# Patient Record
Sex: MALE | Race: WHITE | NOT HISPANIC OR LATINO | Employment: OTHER | URBAN - METROPOLITAN AREA
[De-identification: names, ages, dates, MRNs, and addresses within clinical notes are randomized per-mention and may not be internally consistent; named-entity substitution may affect disease eponyms.]

---

## 2017-01-14 ENCOUNTER — HOSPITAL ENCOUNTER (EMERGENCY)
Facility: HOSPITAL | Age: 62
Discharge: HOME | End: 2017-01-14
Attending: EMERGENCY MEDICINE | Admitting: EMERGENCY MEDICINE

## 2017-01-14 ENCOUNTER — APPOINTMENT (OUTPATIENT)
Dept: CT IMAGING | Facility: HOSPITAL | Age: 62
End: 2017-01-14

## 2017-01-14 ENCOUNTER — APPOINTMENT (OUTPATIENT)
Dept: GENERAL RADIOLOGY | Facility: HOSPITAL | Age: 62
End: 2017-01-14

## 2017-01-14 VITALS
SYSTOLIC BLOOD PRESSURE: 142 MMHG | WEIGHT: 240 LBS | HEIGHT: 69 IN | DIASTOLIC BLOOD PRESSURE: 90 MMHG | OXYGEN SATURATION: 92 % | HEART RATE: 89 BPM | TEMPERATURE: 98.3 F | BODY MASS INDEX: 35.55 KG/M2 | RESPIRATION RATE: 16 BRPM

## 2017-01-14 DIAGNOSIS — S39.011A ABDOMINAL WALL STRAIN, INITIAL ENCOUNTER: Primary | ICD-10-CM

## 2017-01-14 LAB
ALBUMIN SERPL-MCNC: 4.2 G/DL (ref 3.5–5.2)
ALBUMIN/GLOB SERPL: 1.3 G/DL
ALP SERPL-CCNC: 96 U/L (ref 39–117)
ALT SERPL W P-5'-P-CCNC: 10 U/L (ref 1–41)
ANION GAP SERPL CALCULATED.3IONS-SCNC: 15.2 MMOL/L
AST SERPL-CCNC: 14 U/L (ref 1–40)
BASOPHILS # BLD AUTO: 0.06 10*3/MM3 (ref 0–0.2)
BASOPHILS NFR BLD AUTO: 0.6 % (ref 0–1.5)
BILIRUB SERPL-MCNC: 0.3 MG/DL (ref 0.1–1.2)
BILIRUB UR QL STRIP: NEGATIVE
BUN BLD-MCNC: 23 MG/DL (ref 8–23)
BUN/CREAT SERPL: 16.3 (ref 7–25)
CALCIUM SPEC-SCNC: 9.3 MG/DL (ref 8.6–10.5)
CHLORIDE SERPL-SCNC: 101 MMOL/L (ref 98–107)
CLARITY UR: CLEAR
CO2 SERPL-SCNC: 22.8 MMOL/L (ref 22–29)
COLOR UR: YELLOW
CREAT BLD-MCNC: 1.41 MG/DL (ref 0.76–1.27)
DEPRECATED RDW RBC AUTO: 45.6 FL (ref 37–54)
EOSINOPHIL # BLD AUTO: 0.44 10*3/MM3 (ref 0–0.7)
EOSINOPHIL NFR BLD AUTO: 4.5 % (ref 0.3–6.2)
ERYTHROCYTE [DISTWIDTH] IN BLOOD BY AUTOMATED COUNT: 13.3 % (ref 11.5–14.5)
GFR SERPL CREATININE-BSD FRML MDRD: 51 ML/MIN/1.73
GLOBULIN UR ELPH-MCNC: 3.2 GM/DL
GLUCOSE BLD-MCNC: 204 MG/DL (ref 65–99)
GLUCOSE UR STRIP-MCNC: ABNORMAL MG/DL
HCT VFR BLD AUTO: 40.7 % (ref 40.4–52.2)
HGB BLD-MCNC: 13.4 G/DL (ref 13.7–17.6)
HGB UR QL STRIP.AUTO: NEGATIVE
IMM GRANULOCYTES # BLD: 0.03 10*3/MM3 (ref 0–0.03)
IMM GRANULOCYTES NFR BLD: 0.3 % (ref 0–0.5)
KETONES UR QL STRIP: NEGATIVE
LEUKOCYTE ESTERASE UR QL STRIP.AUTO: NEGATIVE
LIPASE SERPL-CCNC: 107 U/L (ref 13–60)
LYMPHOCYTES # BLD AUTO: 2.56 10*3/MM3 (ref 0.9–4.8)
LYMPHOCYTES NFR BLD AUTO: 26 % (ref 19.6–45.3)
MCH RBC QN AUTO: 31.2 PG (ref 27–32.7)
MCHC RBC AUTO-ENTMCNC: 32.9 G/DL (ref 32.6–36.4)
MCV RBC AUTO: 94.7 FL (ref 79.8–96.2)
MONOCYTES # BLD AUTO: 0.49 10*3/MM3 (ref 0.2–1.2)
MONOCYTES NFR BLD AUTO: 5 % (ref 5–12)
NEUTROPHILS # BLD AUTO: 6.25 10*3/MM3 (ref 1.9–8.1)
NEUTROPHILS NFR BLD AUTO: 63.6 % (ref 42.7–76)
NITRITE UR QL STRIP: NEGATIVE
PH UR STRIP.AUTO: <=5 [PH] (ref 5–8)
PLATELET # BLD AUTO: 207 10*3/MM3 (ref 140–500)
PMV BLD AUTO: 9.9 FL (ref 6–12)
POTASSIUM BLD-SCNC: 4.6 MMOL/L (ref 3.5–5.2)
PROT SERPL-MCNC: 7.4 G/DL (ref 6–8.5)
PROT UR QL STRIP: NEGATIVE
RBC # BLD AUTO: 4.3 10*6/MM3 (ref 4.6–6)
SODIUM BLD-SCNC: 139 MMOL/L (ref 136–145)
SP GR UR STRIP: 1.03 (ref 1–1.03)
TROPONIN T SERPL-MCNC: <0.01 NG/ML (ref 0–0.03)
UROBILINOGEN UR QL STRIP: ABNORMAL
WBC NRBC COR # BLD: 9.83 10*3/MM3 (ref 4.5–10.7)

## 2017-01-14 PROCEDURE — 74176 CT ABD & PELVIS W/O CONTRAST: CPT

## 2017-01-14 PROCEDURE — 83690 ASSAY OF LIPASE: CPT | Performed by: EMERGENCY MEDICINE

## 2017-01-14 PROCEDURE — 80053 COMPREHEN METABOLIC PANEL: CPT | Performed by: EMERGENCY MEDICINE

## 2017-01-14 PROCEDURE — 99284 EMERGENCY DEPT VISIT MOD MDM: CPT

## 2017-01-14 PROCEDURE — 81003 URINALYSIS AUTO W/O SCOPE: CPT | Performed by: EMERGENCY MEDICINE

## 2017-01-14 PROCEDURE — 71101 X-RAY EXAM UNILAT RIBS/CHEST: CPT

## 2017-01-14 PROCEDURE — 84484 ASSAY OF TROPONIN QUANT: CPT | Performed by: EMERGENCY MEDICINE

## 2017-01-14 PROCEDURE — 85025 COMPLETE CBC W/AUTO DIFF WBC: CPT | Performed by: EMERGENCY MEDICINE

## 2017-01-14 PROCEDURE — 93010 ELECTROCARDIOGRAM REPORT: CPT | Performed by: INTERNAL MEDICINE

## 2017-01-14 PROCEDURE — 93005 ELECTROCARDIOGRAM TRACING: CPT | Performed by: EMERGENCY MEDICINE

## 2017-01-14 RX ORDER — SODIUM CHLORIDE 0.9 % (FLUSH) 0.9 %
10 SYRINGE (ML) INJECTION AS NEEDED
Status: DISCONTINUED | OUTPATIENT
Start: 2017-01-14 | End: 2017-01-14 | Stop reason: HOSPADM

## 2017-01-14 RX ORDER — ONDANSETRON 2 MG/ML
4 INJECTION INTRAMUSCULAR; INTRAVENOUS ONCE
Status: DISCONTINUED | OUTPATIENT
Start: 2017-01-14 | End: 2017-01-14 | Stop reason: HOSPADM

## 2017-01-14 NOTE — DISCHARGE INSTRUCTIONS
You can use your home hydrocodone to help with your pain. Use over the counter Miralax powder once a day while using the hydrocodone.  Please return to the ED if symptoms worsen and follow up with your family doctor.

## 2017-01-14 NOTE — ED NOTES
Pt. C/o Abd pain/ doesn't c/o of coughing, but has coughed while here.      Portillo Nicole RN  01/14/17 0967

## 2017-01-14 NOTE — ED PROVIDER NOTES
" EMERGENCY DEPARTMENT ENCOUNTER    CHIEF COMPLAINT  Chief Complaint: Right-sided abd pain  History given by: Patient  History limited by: Nothing  Room Number: 30/30  PMD: Kevin Pozo MD      HPI:  Pt is a 61 y.o. male who presents complaining of intermittent RUQ abd pain onset 0700 yesterday morning with symptoms worsening at 1700 yesterday. The pt states the pain is not constant, but is only present when he moves. The pt denies radiation. The pt states his episodes last about 15 seconds. The pt states the pain is worse with movement. The pt states that fatty food does not affect the pain. The pt denies lifting any heavy objects. Pt denies nausea, vomiting, hematuria, fever, and rash. Pt reports productive cough with yellow sputum. The pt reports hx of carotid surgery. The pt reports hx of kidney stones.    Duration: Since 0700 yesterday morning with symptoms worsening at 1700 yesterday  Onset: Gradual  Timing: Intermittent  Location: RUQ  Radiation: None  Quality: \"Pain\"  Intensity/Severity: Moderate  Progression: Unchanged  Associated Symptoms: Productive cough with yellow sputum  Aggravating Factors: Movement  Alleviating Factors: Nothing  Previous Episodes: None  Treatment before arrival: Nothing    PAST MEDICAL HISTORY  Active Ambulatory Problems     Diagnosis Date Noted   • Asymptomatic stenosis of right carotid artery without infarction 11/16/2016   • Chronic renal insufficiency, stage II (mild) 11/16/2016   • Type 2 diabetes mellitus with diabetic neuropathic arthropathy 11/16/2016   • Hypertension associated with chronic kidney disease due to type 2 diabetes mellitus 11/16/2016   • Hyperlipemia 11/16/2016   • Atherosclerosis with claudication of extremity 11/16/2016   • Coronary artery disease involving native coronary artery without angina pectoris 11/16/2016     Resolved Ambulatory Problems     Diagnosis Date Noted   • No Resolved Ambulatory Problems     Past Medical History   Diagnosis Date   • Carotid " artery stenosis    • Diabetes mellitus    • Hyperlipidemia    • Hypertension    • Peripheral vascular disease    • Psoriasis        PAST SURGICAL HISTORY  Past Surgical History   Procedure Laterality Date   • Knee surgery Left    • Finger surgery Left    • Pr thromboendartectmy neck,neck incis Right 11/16/2016     Procedure: RIGHT CAROTID ENDARTERECTOMY;  Surgeon: Roger Michael MD;  Location: [facility] OR;  Service: Vascular   • Knee arthroscopy         FAMILY HISTORY  Family History   Problem Relation Age of Onset   • Valvular heart disease Mother    • Heart disease Father    • Cancer Paternal Grandfather        SOCIAL HISTORY  Social History     Social History   • Marital status: Single     Spouse name: N/A   • Number of children: N/A   • Years of education: N/A     Occupational History   • Not on file.     Social History Main Topics   • Smoking status: Current Every Day Smoker     Packs/day: 1.00     Years: 30.00     Types: Cigarettes   • Smokeless tobacco: Never Used   • Alcohol use No      Comment: OCC   • Drug use: No   • Sexual activity: Defer     Other Topics Concern   • Not on file     Social History Narrative   • No narrative on file       ALLERGIES  Erythromycin    REVIEW OF SYSTEMS  Review of Systems   Constitutional: Negative for chills and fever.   HENT: Negative for sore throat and trouble swallowing.    Eyes: Negative for visual disturbance.   Respiratory: Positive for cough (Productive with yellow sputum). Negative for shortness of breath.    Cardiovascular: Negative for chest pain and leg swelling.   Gastrointestinal: Positive for abdominal pain (RUQ). Negative for diarrhea and vomiting.   Endocrine: Negative.    Genitourinary: Negative for decreased urine volume and frequency.   Musculoskeletal: Negative for neck pain.   Skin: Negative for rash.   Allergic/Immunologic: Negative.    Neurological: Negative for weakness and numbness.   Hematological: Negative.    Psychiatric/Behavioral:  Negative.    All other systems reviewed and are negative.      PHYSICAL EXAM  ED Triage Vitals   Temp Heart Rate Resp BP SpO2   01/14/17 0922 01/14/17 0922 01/14/17 0922 01/14/17 0940 01/14/17 0922   98.3 °F (36.8 °C) 123 16 125/95 95 %      Temp src Heart Rate Source Patient Position BP Location FiO2 (%)   01/14/17 0922 01/14/17 1036 01/14/17 0940 01/14/17 0940 --   Tympanic Monitor Lying Right arm          Physical Exam   Constitutional: He is oriented to person, place, and time. He appears distressed (Mild).   The pt is well developed.   HENT:   Head: Normocephalic and atraumatic.   Eyes: EOM are normal. Pupils are equal, round, and reactive to light.   Neck: Normal range of motion. Neck supple.   Cardiovascular: Normal rate, regular rhythm and normal heart sounds.  Exam reveals no gallop.    No murmur heard.  Pulmonary/Chest: Effort normal. No respiratory distress. He has rhonchi (Right posterior lower).   Abdominal: Soft. Bowel sounds are normal. There is tenderness (Mild) in the right upper quadrant. There is no rebound, no guarding and negative Mcdaniels's sign.       Musculoskeletal: Normal range of motion. He exhibits no edema.   Neurological: He is alert and oriented to person, place, and time. He has normal sensation and normal strength.   Skin: Skin is warm and dry.   Psychiatric: Mood and affect normal.   Nursing note and vitals reviewed.      LAB RESULTS  Lab Results (last 24 hours)     Procedure Component Value Units Date/Time    CBC & Differential [[id number]] Collected:  01/14/17 0956    Specimen:  Blood Updated:  01/14/17 1010    Narrative:       The following orders were created for panel order CBC & Differential.  Procedure                               Abnormality         Status                     ---------                               -----------         ------                     CBC Auto Differential[[id number]]         Abnormal            Final result                 Please view results for these  tests on the individual orders.    Comprehensive Metabolic Panel [[id number]]  (Abnormal) Collected:  01/14/17 0956    Specimen:  Blood Updated:  01/14/17 1033     Glucose 204 (H) mg/dL      BUN 23 mg/dL      Creatinine 1.41 (H) mg/dL      Sodium 139 mmol/L      Potassium 4.6 mmol/L      Chloride 101 mmol/L      CO2 22.8 mmol/L      Calcium 9.3 mg/dL      Total Protein 7.4 g/dL      Albumin 4.20 g/dL      ALT (SGPT) 10 U/L      AST (SGOT) 14 U/L      Alkaline Phosphatase 96 U/L      Total Bilirubin 0.3 mg/dL      eGFR Non African Amer 51 (L) mL/min/1.73      Globulin 3.2 gm/dL      A/G Ratio 1.3 g/dL      BUN/Creatinine Ratio 16.3      Anion Gap 15.2 mmol/L     Lipase [[id number]]  (Abnormal) Collected:  01/14/17 0956    Specimen:  Blood Updated:  01/14/17 1033     Lipase 107 (H) U/L     Troponin [[id number]]  (Normal) Collected:  01/14/17 0956    Specimen:  Blood Updated:  01/14/17 1033     Troponin T <0.010 ng/mL     Narrative:       Troponin T Reference Ranges:  Less than 0.03 ng/mL:    Negative for AMI  0.03 to 0.09 ng/mL:      Indeterminant for AMI  Greater than 0.09 ng/mL: Positive for AMI    CBC Auto Differential [[id number]]  (Abnormal) Collected:  01/14/17 0956    Specimen:  Blood Updated:  01/14/17 1010     WBC 9.83 10*3/mm3      RBC 4.30 (L) 10*6/mm3      Hemoglobin 13.4 (L) g/dL      Hematocrit 40.7 %      MCV 94.7 fL      MCH 31.2 pg      MCHC 32.9 g/dL      RDW 13.3 %      RDW-SD 45.6 fl      MPV 9.9 fL      Platelets 207 10*3/mm3      Neutrophil % 63.6 %      Lymphocyte % 26.0 %      Monocyte % 5.0 %      Eosinophil % 4.5 %      Basophil % 0.6 %      Immature Grans % 0.3 %      Neutrophils, Absolute 6.25 10*3/mm3      Lymphocytes, Absolute 2.56 10*3/mm3      Monocytes, Absolute 0.49 10*3/mm3      Eosinophils, Absolute 0.44 10*3/mm3      Basophils, Absolute 0.06 10*3/mm3      Immature Grans, Absolute 0.03 10*3/mm3     Urinalysis With / Culture If Indicated [[id number]]  (Abnormal) Collected:  01/14/17 1039     Specimen:  Urine from Urine, Clean Catch Updated:  01/14/17 1040     Color, UA Yellow      Appearance, UA Clear      pH, UA <=5.0      Specific Gravity, UA 1.026      Glucose,  mg/dL (Trace) (A)      Ketones, UA Negative      Bilirubin, UA Negative      Blood, UA Negative      Protein, UA Negative      Leuk Esterase, UA Negative      Nitrite, UA Negative      Urobilinogen, UA 1.0 E.U./dL     Narrative:       Urine microscopic not indicated.          I ordered the above labs and reviewed the results    RADIOLOGY  XR Ribs Right With PA Chest   The heart is within normal limits in size. There is no evidence  of focal infiltrate, effusion or of congestive failure. There is mild  interstitial prominence involving the lung bases bilaterally. AP and  oblique views of the right hemithorax showed no evidence of a displaced  rib fracture. There is no evidence of pneumothorax.      CT Abdomen Pelvis Stone Protocol - Thickening of the distal esophagus, right renal stones that are unchanged from previous, and bilateral inguinal hernias that are unchanged from previous        I ordered the above noted radiological studies. Interpreted by radiologist. Discussed with radiologist (Dr. Ceja). Reviewed by me in PACS.       PROCEDURES  Procedures    EKG           EKG time: 0956  Rhythm/Rate: 92 Normal Sinus and occasional PAC  P waves and ND: Normal  QRS, axis: Normal   ST and T waves: Normal     Interpreted Contemporaneously by me, independently viewed  No prior for comparison      PROGRESS AND CONSULTS  ED Course     0948  Labs, EKG, and XR Right Ribs with Chest ordered for further evaluation. Morphine and Zofran ordered.    1100  CT Abd ordered for further evaluation.    1111  Rechecked pt, he is resting comfortably after receiving the Morphine. Discussed the XR and lab results with the pt. Discussed the plan to order a CT for further evaluation into the pt's elevated Lipase. The pt understands and agrees with the  plan.    1125  The pt refused taking contrast because he is a diabetic. I will order a CT Abd without contrast.    1206  Rechecked pt, he is resting comfortably. Discussed the CT results with the pt. Discussed the possible causes of the pt's pain. Discussed the plan to discharge the pt due to his negative work up. The pt understands and agrees with the plan.    I do not believe this is a PE because the pt's pain is so easily reproducible to palpation.    MEDICAL DECISION MAKING  Results were reviewed/discussed with the patient and they were also made aware of online access. Pt also made aware that some labs, such as cultures, will not be resulted during ER visit and follow up with PMD is necessary.     MDM  Number of Diagnoses or Management Options     Amount and/or Complexity of Data Reviewed  Clinical lab tests: ordered and reviewed (Lipase - 107)  Tests in the radiology section of CPT®: ordered and reviewed (XR Right Ribs with Chest - The heart is within normal limits in size. There is no evidence of focal infiltrate, effusion or of congestive failure. There is mild interstitial prominence involving the lung bases bilaterally. AP and  oblique views of the right hemithorax showed no evidence of a displaced rib fracture. There is no evidence of pneumothorax.  CT Abdomen Pelvis Stone Protocol - Thickening of the distal esophagus, right renal stones that are unchanged from previous, and bilateral inguinal hernias that are unchanged from previous)  Tests in the medicine section of CPT®: ordered and reviewed  Discussion of test results with the performing providers: yes (Dr. Ceja)  Discuss the patient with other providers: yes  Independent visualization of images, tracings, or specimens: yes    Patient Progress  Patient progress: stable         DIAGNOSIS  Final diagnoses:   Abdominal wall strain, initial encounter       DISPOSITION  DISCHARGE    Patient discharged in stable condition.    Reviewed implications of  results, diagnosis, meds, responsibility to follow up, warning signs and symptoms of possible worsening, potential complications and reasons to return to ER, including increased abd pain.    Patient/Family voiced understanding of above instructions.    Discussed plan for discharge, as there is no emergent indication for admission.  Pt/family is agreeable and understands need for follow up and repeat testing.  Pt is aware that discharge does not mean that nothing is wrong but it indicates no emergency is present that requires admission and they must continue care with follow-up as given below or physician of their choice.     FOLLOW-UP  Kevin Pozo MD  [id number] [address]  [phone]    Schedule an appointment as soon as possible for a visit           Medication List      Notice     No changes were made to your prescriptions during this visit.            Latest Documented Vital Signs:  As of 12:12 PM  BP- 126/82 HR- 84 Temp- 98.3 °F (36.8 °C) (Tympanic) O2 sat- 94%    --  Documentation assistance provided by traci Carranza for Dr. Francois.  Information recorded by the scribe was done at my direction and has been verified and validated by me.         David Carranza  01/14/17 1212       Gaurang Francois MD  01/14/17 0406

## 2017-09-18 RX ORDER — CARVEDILOL 3.12 MG/1
TABLET ORAL
Qty: 60 TABLET | Refills: 2 | Status: SHIPPED | OUTPATIENT
Start: 2017-09-18 | End: 2017-12-14 | Stop reason: SDUPTHER

## 2017-12-14 RX ORDER — CARVEDILOL 3.12 MG/1
TABLET ORAL
Qty: 60 TABLET | Refills: 2 | Status: SHIPPED | OUTPATIENT
Start: 2017-12-14 | End: 2018-05-11 | Stop reason: SDUPTHER

## 2018-05-25 RX ORDER — CARVEDILOL 3.12 MG/1
3.12 TABLET ORAL 2 TIMES DAILY
Qty: 60 TABLET | Refills: 3 | Status: SHIPPED | OUTPATIENT
Start: 2018-05-25 | End: 2018-11-30 | Stop reason: SDUPTHER

## 2018-05-25 RX ORDER — CARVEDILOL 3.12 MG/1
TABLET ORAL
Qty: 60 TABLET | Refills: 3 | Status: SHIPPED | OUTPATIENT
Start: 2018-05-25 | End: 2018-05-25 | Stop reason: SDUPTHER

## 2018-05-25 NOTE — TELEPHONE ENCOUNTER
Former [facility] pt, [facility], but nt[facility] on a Sat. Scheduled 8/4. Brenna will mail appt card w/date/time & address. Pt understands verbally.    Refilled x3-amk

## 2018-11-30 RX ORDER — CARVEDILOL 3.12 MG/1
TABLET ORAL
Qty: 60 TABLET | Refills: 0 | Status: SHIPPED | OUTPATIENT
Start: 2018-11-30 | End: 2019-01-06 | Stop reason: SDUPTHER

## 2019-01-08 RX ORDER — CARVEDILOL 3.12 MG/1
TABLET ORAL
Qty: 60 TABLET | Refills: 3 | Status: SHIPPED | OUTPATIENT
Start: 2019-01-08 | End: 2019-07-19 | Stop reason: SDUPTHER

## 2019-07-19 RX ORDER — CARVEDILOL 3.12 MG/1
TABLET ORAL
Qty: 60 TABLET | Refills: 3 | Status: SHIPPED | OUTPATIENT
Start: 2019-07-19 | End: 2020-02-10

## 2020-02-10 RX ORDER — CARVEDILOL 3.12 MG/1
TABLET ORAL
Qty: 60 TABLET | Refills: 3 | Status: SHIPPED | OUTPATIENT
Start: 2020-02-10 | End: 2020-08-10

## 2020-08-10 RX ORDER — CARVEDILOL 3.12 MG/1
TABLET ORAL
Qty: 60 TABLET | Refills: 0 | Status: SHIPPED | OUTPATIENT
Start: 2020-08-10

## 2020-09-10 RX ORDER — CARVEDILOL 3.12 MG/1
TABLET ORAL
Qty: 60 TABLET | Refills: 0 | OUTPATIENT
Start: 2020-09-10

## 2022-02-15 ENCOUNTER — APPOINTMENT (OUTPATIENT)
Dept: GENERAL RADIOLOGY | Facility: HOSPITAL | Age: 67
End: 2022-02-15

## 2022-02-15 ENCOUNTER — ANESTHESIA (OUTPATIENT)
Dept: PERIOP | Facility: HOSPITAL | Age: 67
End: 2022-02-15

## 2022-02-15 ENCOUNTER — APPOINTMENT (OUTPATIENT)
Dept: ULTRASOUND IMAGING | Facility: HOSPITAL | Age: 67
End: 2022-02-15

## 2022-02-15 ENCOUNTER — HOSPITAL ENCOUNTER (OUTPATIENT)
Facility: HOSPITAL | Age: 67
Discharge: HOME | End: 2022-02-16
Attending: EMERGENCY MEDICINE | Admitting: INTERNAL MEDICINE

## 2022-02-15 ENCOUNTER — ANESTHESIA EVENT (OUTPATIENT)
Dept: PERIOP | Facility: HOSPITAL | Age: 67
End: 2022-02-15

## 2022-02-15 ENCOUNTER — APPOINTMENT (OUTPATIENT)
Dept: CT IMAGING | Facility: HOSPITAL | Age: 67
End: 2022-02-15

## 2022-02-15 DIAGNOSIS — N18.9 CHRONIC KIDNEY DISEASE, UNSPECIFIED CKD STAGE: ICD-10-CM

## 2022-02-15 DIAGNOSIS — D72.829 LEUKOCYTOSIS, UNSPECIFIED TYPE: ICD-10-CM

## 2022-02-15 DIAGNOSIS — N20.1 LEFT URETERAL CALCULUS: Primary | ICD-10-CM

## 2022-02-15 DIAGNOSIS — R73.9 HYPERGLYCEMIA: ICD-10-CM

## 2022-02-15 PROBLEM — I73.9 PERIPHERAL VASCULAR DISEASE (HCC): Status: ACTIVE | Noted: 2022-02-15

## 2022-02-15 PROBLEM — N45.1 RIGHT EPIDIDYMITIS: Status: ACTIVE | Noted: 2022-02-15

## 2022-02-15 PROBLEM — E11.9 TYPE 2 DIABETES MELLITUS (HCC): Status: ACTIVE | Noted: 2022-02-15

## 2022-02-15 PROBLEM — I10 ESSENTIAL HYPERTENSION: Status: ACTIVE | Noted: 2022-02-15

## 2022-02-15 PROBLEM — K21.00 GERD WITH ESOPHAGITIS: Status: ACTIVE | Noted: 2022-02-15

## 2022-02-15 PROBLEM — N13.30 HYDROURETERONEPHROSIS: Status: ACTIVE | Noted: 2022-02-15

## 2022-02-15 PROBLEM — N18.30 CHRONIC KIDNEY FAILURE, STAGE 3 (MODERATE): Status: ACTIVE | Noted: 2022-02-15

## 2022-02-15 LAB
ALBUMIN SERPL-MCNC: 4.1 G/DL (ref 3.5–5.2)
ALBUMIN/GLOB SERPL: 1.2 G/DL
ALP SERPL-CCNC: 132 U/L (ref 39–117)
ALT SERPL W P-5'-P-CCNC: 26 U/L (ref 1–41)
ANION GAP SERPL CALCULATED.3IONS-SCNC: 13 MMOL/L (ref 5–15)
AST SERPL-CCNC: 25 U/L (ref 1–40)
BACTERIA UR QL AUTO: ABNORMAL /HPF
BASOPHILS # BLD AUTO: 0.06 10*3/MM3 (ref 0–0.2)
BASOPHILS NFR BLD AUTO: 0.4 % (ref 0–1.5)
BILIRUB SERPL-MCNC: 0.5 MG/DL (ref 0–1.2)
BILIRUB UR QL STRIP: NEGATIVE
BUN SERPL-MCNC: 20 MG/DL (ref 8–23)
BUN/CREAT SERPL: 14.4 (ref 7–25)
CALCIUM SPEC-SCNC: 9.6 MG/DL (ref 8.6–10.5)
CHLORIDE SERPL-SCNC: 99 MMOL/L (ref 98–107)
CLARITY UR: CLEAR
CO2 SERPL-SCNC: 21 MMOL/L (ref 22–29)
COLOR UR: YELLOW
CREAT SERPL-MCNC: 1.39 MG/DL (ref 0.76–1.27)
DEPRECATED RDW RBC AUTO: 40.3 FL (ref 37–54)
EOSINOPHIL # BLD AUTO: 0.39 10*3/MM3 (ref 0–0.4)
EOSINOPHIL NFR BLD AUTO: 2.3 % (ref 0.3–6.2)
ERYTHROCYTE [DISTWIDTH] IN BLOOD BY AUTOMATED COUNT: 12.5 % (ref 12.3–15.4)
GFR SERPL CREATININE-BSD FRML MDRD: 51 ML/MIN/1.73
GLOBULIN UR ELPH-MCNC: 3.4 GM/DL
GLUCOSE BLDC GLUCOMTR-MCNC: 184 MG/DL (ref 70–130)
GLUCOSE BLDC GLUCOMTR-MCNC: 189 MG/DL (ref 70–130)
GLUCOSE BLDC GLUCOMTR-MCNC: 197 MG/DL (ref 70–130)
GLUCOSE SERPL-MCNC: 268 MG/DL (ref 65–99)
GLUCOSE UR STRIP-MCNC: ABNORMAL MG/DL
HCT VFR BLD AUTO: 49 % (ref 37.5–51)
HGB BLD-MCNC: 17.3 G/DL (ref 13–17.7)
HGB UR QL STRIP.AUTO: ABNORMAL
HOLD SPECIMEN: NORMAL
HYALINE CASTS UR QL AUTO: ABNORMAL /LPF
IMM GRANULOCYTES # BLD AUTO: 0.07 10*3/MM3 (ref 0–0.05)
IMM GRANULOCYTES NFR BLD AUTO: 0.4 % (ref 0–0.5)
KETONES UR QL STRIP: ABNORMAL
LEUKOCYTE ESTERASE UR QL STRIP.AUTO: NEGATIVE
LIPASE SERPL-CCNC: 33 U/L (ref 13–60)
LYMPHOCYTES # BLD AUTO: 2.12 10*3/MM3 (ref 0.7–3.1)
LYMPHOCYTES NFR BLD AUTO: 12.6 % (ref 19.6–45.3)
MCH RBC QN AUTO: 32 PG (ref 26.6–33)
MCHC RBC AUTO-ENTMCNC: 35.3 G/DL (ref 31.5–35.7)
MCV RBC AUTO: 90.6 FL (ref 79–97)
MONOCYTES # BLD AUTO: 1.69 10*3/MM3 (ref 0.1–0.9)
MONOCYTES NFR BLD AUTO: 10.1 % (ref 5–12)
NEUTROPHILS NFR BLD AUTO: 12.45 10*3/MM3 (ref 1.7–7)
NEUTROPHILS NFR BLD AUTO: 74.2 % (ref 42.7–76)
NITRITE UR QL STRIP: NEGATIVE
NRBC BLD AUTO-RTO: 0 /100 WBC (ref 0–0.2)
PH UR STRIP.AUTO: <=5 [PH] (ref 5–8)
PLATELET # BLD AUTO: 191 10*3/MM3 (ref 140–450)
PMV BLD AUTO: 9.7 FL (ref 6–12)
POTASSIUM SERPL-SCNC: 4.2 MMOL/L (ref 3.5–5.2)
PROT SERPL-MCNC: 7.5 G/DL (ref 6–8.5)
PROT UR QL STRIP: ABNORMAL
RBC # BLD AUTO: 5.41 10*6/MM3 (ref 4.14–5.8)
RBC # UR STRIP: ABNORMAL /HPF
REF LAB TEST METHOD: ABNORMAL
SARS-COV-2 RNA PNL SPEC NAA+PROBE: NOT DETECTED
SODIUM SERPL-SCNC: 133 MMOL/L (ref 136–145)
SP GR UR STRIP: >=1.03 (ref 1–1.03)
SQUAMOUS #/AREA URNS HPF: ABNORMAL /HPF
UROBILINOGEN UR QL STRIP: ABNORMAL
WBC # UR STRIP: ABNORMAL /HPF
WBC NRBC COR # BLD: 16.78 10*3/MM3 (ref 3.4–10.8)
WHOLE BLOOD HOLD SPECIMEN: NORMAL

## 2022-02-15 PROCEDURE — G0378 HOSPITAL OBSERVATION PER HR: HCPCS

## 2022-02-15 PROCEDURE — C2617 STENT, NON-COR, TEM W/O DEL: HCPCS | Performed by: UROLOGY

## 2022-02-15 PROCEDURE — 63710000001 CILOSTAZOL 100 MG TABLET: Performed by: INTERNAL MEDICINE

## 2022-02-15 PROCEDURE — A9270 NON-COVERED ITEM OR SERVICE: HCPCS | Performed by: INTERNAL MEDICINE

## 2022-02-15 PROCEDURE — 80053 COMPREHEN METABOLIC PANEL: CPT | Performed by: EMERGENCY MEDICINE

## 2022-02-15 PROCEDURE — 0 CEFAZOLIN IN DEXTROSE 2-4 GM/100ML-% SOLUTION: Performed by: UROLOGY

## 2022-02-15 PROCEDURE — 25010000002 KETOROLAC TROMETHAMINE PER 15 MG: Performed by: EMERGENCY MEDICINE

## 2022-02-15 PROCEDURE — 83690 ASSAY OF LIPASE: CPT | Performed by: EMERGENCY MEDICINE

## 2022-02-15 PROCEDURE — 87635 SARS-COV-2 COVID-19 AMP PRB: CPT | Performed by: EMERGENCY MEDICINE

## 2022-02-15 PROCEDURE — 25010000002 MORPHINE PER 10 MG: Performed by: EMERGENCY MEDICINE

## 2022-02-15 PROCEDURE — 25010000002 ONDANSETRON PER 1 MG: Performed by: EMERGENCY MEDICINE

## 2022-02-15 PROCEDURE — 96375 TX/PRO/DX INJ NEW DRUG ADDON: CPT

## 2022-02-15 PROCEDURE — C1769 GUIDE WIRE: HCPCS | Performed by: UROLOGY

## 2022-02-15 PROCEDURE — 96361 HYDRATE IV INFUSION ADD-ON: CPT

## 2022-02-15 PROCEDURE — 74420 UROGRAPHY RTRGR +-KUB: CPT

## 2022-02-15 PROCEDURE — 36415 COLL VENOUS BLD VENIPUNCTURE: CPT

## 2022-02-15 PROCEDURE — 25010000002 PROPOFOL 10 MG/ML EMULSION: Performed by: NURSE ANESTHETIST, CERTIFIED REGISTERED

## 2022-02-15 PROCEDURE — 63710000001 CARVEDILOL 3.125 MG TABLET: Performed by: INTERNAL MEDICINE

## 2022-02-15 PROCEDURE — C9803 HOPD COVID-19 SPEC COLLECT: HCPCS

## 2022-02-15 PROCEDURE — 99284 EMERGENCY DEPT VISIT MOD MDM: CPT

## 2022-02-15 PROCEDURE — 63710000001 SENNOSIDES-DOCUSATE 8.6-50 MG TABLET: Performed by: INTERNAL MEDICINE

## 2022-02-15 PROCEDURE — 93976 VASCULAR STUDY: CPT

## 2022-02-15 PROCEDURE — 0 CEFAZOLIN IN DEXTROSE 2-4 GM/100ML-% SOLUTION: Performed by: NURSE ANESTHETIST, CERTIFIED REGISTERED

## 2022-02-15 PROCEDURE — 25010000002 ONDANSETRON PER 1 MG: Performed by: NURSE ANESTHETIST, CERTIFIED REGISTERED

## 2022-02-15 PROCEDURE — 25010000002 CEFTRIAXONE PER 250 MG: Performed by: INTERNAL MEDICINE

## 2022-02-15 PROCEDURE — 76870 US EXAM SCROTUM: CPT

## 2022-02-15 PROCEDURE — 85025 COMPLETE CBC W/AUTO DIFF WBC: CPT | Performed by: EMERGENCY MEDICINE

## 2022-02-15 PROCEDURE — 74176 CT ABD & PELVIS W/O CONTRAST: CPT

## 2022-02-15 PROCEDURE — 25010000002 FENTANYL CITRATE (PF) 50 MCG/ML SOLUTION: Performed by: NURSE ANESTHETIST, CERTIFIED REGISTERED

## 2022-02-15 PROCEDURE — 96374 THER/PROPH/DIAG INJ IV PUSH: CPT

## 2022-02-15 PROCEDURE — 81001 URINALYSIS AUTO W/SCOPE: CPT | Performed by: EMERGENCY MEDICINE

## 2022-02-15 PROCEDURE — 25010000002 DEXAMETHASONE PER 1 MG: Performed by: NURSE ANESTHETIST, CERTIFIED REGISTERED

## 2022-02-15 PROCEDURE — 82962 GLUCOSE BLOOD TEST: CPT

## 2022-02-15 DEVICE — URETERAL STENT
Type: IMPLANTABLE DEVICE | Site: URETER | Status: FUNCTIONAL
Brand: CONTOUR™

## 2022-02-15 RX ORDER — PROMETHAZINE HYDROCHLORIDE 25 MG/1
25 TABLET ORAL ONCE AS NEEDED
Status: DISCONTINUED | OUTPATIENT
Start: 2022-02-15 | End: 2022-02-15 | Stop reason: HOSPADM

## 2022-02-15 RX ORDER — PRAVASTATIN SODIUM 40 MG
40 TABLET ORAL DAILY
Status: DISCONTINUED | OUTPATIENT
Start: 2022-02-16 | End: 2022-02-16 | Stop reason: HOSPADM

## 2022-02-15 RX ORDER — LORAZEPAM 0.5 MG/1
0.5 TABLET ORAL EVERY 8 HOURS PRN
Status: DISCONTINUED | OUTPATIENT
Start: 2022-02-15 | End: 2022-02-16 | Stop reason: HOSPADM

## 2022-02-15 RX ORDER — SODIUM CHLORIDE 9 MG/ML
125 INJECTION, SOLUTION INTRAVENOUS CONTINUOUS
Status: DISCONTINUED | OUTPATIENT
Start: 2022-02-15 | End: 2022-02-15

## 2022-02-15 RX ORDER — SODIUM CHLORIDE 0.9 % (FLUSH) 0.9 %
10 SYRINGE (ML) INJECTION EVERY 12 HOURS SCHEDULED
Status: DISCONTINUED | OUTPATIENT
Start: 2022-02-15 | End: 2022-02-16 | Stop reason: HOSPADM

## 2022-02-15 RX ORDER — PROPOFOL 10 MG/ML
VIAL (ML) INTRAVENOUS AS NEEDED
Status: DISCONTINUED | OUTPATIENT
Start: 2022-02-15 | End: 2022-02-15 | Stop reason: SURG

## 2022-02-15 RX ORDER — SODIUM CHLORIDE, SODIUM LACTATE, POTASSIUM CHLORIDE, CALCIUM CHLORIDE 600; 310; 30; 20 MG/100ML; MG/100ML; MG/100ML; MG/100ML
9 INJECTION, SOLUTION INTRAVENOUS CONTINUOUS
Status: DISCONTINUED | OUTPATIENT
Start: 2022-02-15 | End: 2022-02-15

## 2022-02-15 RX ORDER — PROMETHAZINE HYDROCHLORIDE 25 MG/1
25 SUPPOSITORY RECTAL ONCE AS NEEDED
Status: DISCONTINUED | OUTPATIENT
Start: 2022-02-15 | End: 2022-02-15 | Stop reason: HOSPADM

## 2022-02-15 RX ORDER — SODIUM CHLORIDE 9 MG/ML
100 INJECTION, SOLUTION INTRAVENOUS CONTINUOUS
Status: DISCONTINUED | OUTPATIENT
Start: 2022-02-15 | End: 2022-02-16 | Stop reason: HOSPADM

## 2022-02-15 RX ORDER — INSULIN LISPRO 100 [IU]/ML
0-9 INJECTION, SOLUTION INTRAVENOUS; SUBCUTANEOUS
Status: DISCONTINUED | OUTPATIENT
Start: 2022-02-16 | End: 2022-02-16 | Stop reason: HOSPADM

## 2022-02-15 RX ORDER — SODIUM CHLORIDE 0.9 % (FLUSH) 0.9 %
3 SYRINGE (ML) INJECTION EVERY 12 HOURS SCHEDULED
Status: DISCONTINUED | OUTPATIENT
Start: 2022-02-15 | End: 2022-02-15 | Stop reason: HOSPADM

## 2022-02-15 RX ORDER — CEFAZOLIN SODIUM 2 G/100ML
2 INJECTION, SOLUTION INTRAVENOUS ONCE
Status: COMPLETED | OUTPATIENT
Start: 2022-02-15 | End: 2022-02-15

## 2022-02-15 RX ORDER — FENTANYL CITRATE 50 UG/ML
50 INJECTION, SOLUTION INTRAMUSCULAR; INTRAVENOUS
Status: DISCONTINUED | OUTPATIENT
Start: 2022-02-15 | End: 2022-02-15 | Stop reason: HOSPADM

## 2022-02-15 RX ORDER — MIDAZOLAM HYDROCHLORIDE 1 MG/ML
0.5 INJECTION INTRAMUSCULAR; INTRAVENOUS
Status: DISCONTINUED | OUTPATIENT
Start: 2022-02-15 | End: 2022-02-15

## 2022-02-15 RX ORDER — AMOXICILLIN 250 MG
2 CAPSULE ORAL 2 TIMES DAILY
Status: DISCONTINUED | OUTPATIENT
Start: 2022-02-15 | End: 2022-02-16 | Stop reason: HOSPADM

## 2022-02-15 RX ORDER — ACETAMINOPHEN 325 MG/1
650 TABLET ORAL EVERY 4 HOURS PRN
Status: DISCONTINUED | OUTPATIENT
Start: 2022-02-15 | End: 2022-02-16 | Stop reason: HOSPADM

## 2022-02-15 RX ORDER — ONDANSETRON 2 MG/ML
4 INJECTION INTRAMUSCULAR; INTRAVENOUS ONCE
Status: COMPLETED | OUTPATIENT
Start: 2022-02-15 | End: 2022-02-15

## 2022-02-15 RX ORDER — NICOTINE POLACRILEX 4 MG
15 LOZENGE BUCCAL
Status: DISCONTINUED | OUTPATIENT
Start: 2022-02-15 | End: 2022-02-16 | Stop reason: HOSPADM

## 2022-02-15 RX ORDER — DEXAMETHASONE SODIUM PHOSPHATE 10 MG/ML
INJECTION INTRAMUSCULAR; INTRAVENOUS AS NEEDED
Status: DISCONTINUED | OUTPATIENT
Start: 2022-02-15 | End: 2022-02-15 | Stop reason: SURG

## 2022-02-15 RX ORDER — FENTANYL CITRATE 50 UG/ML
50 INJECTION, SOLUTION INTRAMUSCULAR; INTRAVENOUS
Status: DISCONTINUED | OUTPATIENT
Start: 2022-02-15 | End: 2022-02-15

## 2022-02-15 RX ORDER — HYDRALAZINE HYDROCHLORIDE 20 MG/ML
5 INJECTION INTRAMUSCULAR; INTRAVENOUS
Status: DISCONTINUED | OUTPATIENT
Start: 2022-02-15 | End: 2022-02-15 | Stop reason: HOSPADM

## 2022-02-15 RX ORDER — MORPHINE SULFATE 2 MG/ML
4 INJECTION, SOLUTION INTRAMUSCULAR; INTRAVENOUS ONCE
Status: COMPLETED | OUTPATIENT
Start: 2022-02-15 | End: 2022-02-15

## 2022-02-15 RX ORDER — FLUMAZENIL 0.1 MG/ML
0.2 INJECTION INTRAVENOUS AS NEEDED
Status: DISCONTINUED | OUTPATIENT
Start: 2022-02-15 | End: 2022-02-15 | Stop reason: HOSPADM

## 2022-02-15 RX ORDER — POLYETHYLENE GLYCOL 3350 17 G/17G
17 POWDER, FOR SOLUTION ORAL DAILY PRN
Status: DISCONTINUED | OUTPATIENT
Start: 2022-02-15 | End: 2022-02-16 | Stop reason: HOSPADM

## 2022-02-15 RX ORDER — LIDOCAINE HYDROCHLORIDE 20 MG/ML
INJECTION, SOLUTION INFILTRATION; PERINEURAL AS NEEDED
Status: DISCONTINUED | OUTPATIENT
Start: 2022-02-15 | End: 2022-02-15 | Stop reason: SURG

## 2022-02-15 RX ORDER — OXYCODONE AND ACETAMINOPHEN 7.5; 325 MG/1; MG/1
1 TABLET ORAL EVERY 4 HOURS PRN
Status: DISCONTINUED | OUTPATIENT
Start: 2022-02-15 | End: 2022-02-15 | Stop reason: HOSPADM

## 2022-02-15 RX ORDER — FENTANYL CITRATE 50 UG/ML
INJECTION, SOLUTION INTRAMUSCULAR; INTRAVENOUS AS NEEDED
Status: DISCONTINUED | OUTPATIENT
Start: 2022-02-15 | End: 2022-02-15 | Stop reason: SURG

## 2022-02-15 RX ORDER — LISINOPRIL 10 MG/1
10 TABLET ORAL DAILY
Status: DISCONTINUED | OUTPATIENT
Start: 2022-02-16 | End: 2022-02-16 | Stop reason: HOSPADM

## 2022-02-15 RX ORDER — ASPIRIN 81 MG/1
81 TABLET ORAL DAILY
Status: DISCONTINUED | OUTPATIENT
Start: 2022-02-16 | End: 2022-02-16 | Stop reason: HOSPADM

## 2022-02-15 RX ORDER — MIDAZOLAM HYDROCHLORIDE 1 MG/ML
1 INJECTION INTRAMUSCULAR; INTRAVENOUS
Status: DISCONTINUED | OUTPATIENT
Start: 2022-02-15 | End: 2022-02-15

## 2022-02-15 RX ORDER — CEFAZOLIN SODIUM 2 G/100ML
INJECTION, SOLUTION INTRAVENOUS AS NEEDED
Status: DISCONTINUED | OUTPATIENT
Start: 2022-02-15 | End: 2022-02-15 | Stop reason: SURG

## 2022-02-15 RX ORDER — SODIUM CHLORIDE 9 MG/ML
75 INJECTION, SOLUTION INTRAVENOUS CONTINUOUS
Status: DISCONTINUED | OUTPATIENT
Start: 2022-02-15 | End: 2022-02-15

## 2022-02-15 RX ORDER — LIDOCAINE HYDROCHLORIDE 10 MG/ML
0.5 INJECTION, SOLUTION EPIDURAL; INFILTRATION; INTRACAUDAL; PERINEURAL ONCE AS NEEDED
Status: DISCONTINUED | OUTPATIENT
Start: 2022-02-15 | End: 2022-02-15

## 2022-02-15 RX ORDER — ONDANSETRON 2 MG/ML
INJECTION INTRAMUSCULAR; INTRAVENOUS AS NEEDED
Status: DISCONTINUED | OUTPATIENT
Start: 2022-02-15 | End: 2022-02-15 | Stop reason: SURG

## 2022-02-15 RX ORDER — ONDANSETRON 2 MG/ML
4 INJECTION INTRAMUSCULAR; INTRAVENOUS EVERY 6 HOURS PRN
Status: DISCONTINUED | OUTPATIENT
Start: 2022-02-15 | End: 2022-02-16 | Stop reason: HOSPADM

## 2022-02-15 RX ORDER — CARVEDILOL 3.12 MG/1
3.12 TABLET ORAL 2 TIMES DAILY
Status: DISCONTINUED | OUTPATIENT
Start: 2022-02-15 | End: 2022-02-16 | Stop reason: HOSPADM

## 2022-02-15 RX ORDER — MAGNESIUM HYDROXIDE 1200 MG/15ML
LIQUID ORAL AS NEEDED
Status: DISCONTINUED | OUTPATIENT
Start: 2022-02-15 | End: 2022-02-15 | Stop reason: HOSPADM

## 2022-02-15 RX ORDER — BISACODYL 5 MG/1
5 TABLET, DELAYED RELEASE ORAL DAILY PRN
Status: DISCONTINUED | OUTPATIENT
Start: 2022-02-15 | End: 2022-02-16 | Stop reason: HOSPADM

## 2022-02-15 RX ORDER — BISACODYL 10 MG
10 SUPPOSITORY, RECTAL RECTAL DAILY PRN
Status: DISCONTINUED | OUTPATIENT
Start: 2022-02-15 | End: 2022-02-16 | Stop reason: HOSPADM

## 2022-02-15 RX ORDER — CILOSTAZOL 100 MG/1
100 TABLET ORAL 2 TIMES DAILY
Status: DISCONTINUED | OUTPATIENT
Start: 2022-02-15 | End: 2022-02-16 | Stop reason: HOSPADM

## 2022-02-15 RX ORDER — EPHEDRINE SULFATE 50 MG/ML
5 INJECTION, SOLUTION INTRAVENOUS ONCE AS NEEDED
Status: DISCONTINUED | OUTPATIENT
Start: 2022-02-15 | End: 2022-02-15 | Stop reason: HOSPADM

## 2022-02-15 RX ORDER — ACETAMINOPHEN 650 MG/1
650 SUPPOSITORY RECTAL EVERY 4 HOURS PRN
Status: DISCONTINUED | OUTPATIENT
Start: 2022-02-15 | End: 2022-02-16 | Stop reason: HOSPADM

## 2022-02-15 RX ORDER — FAMOTIDINE 10 MG/ML
20 INJECTION, SOLUTION INTRAVENOUS ONCE
Status: COMPLETED | OUTPATIENT
Start: 2022-02-15 | End: 2022-02-15

## 2022-02-15 RX ORDER — NALOXONE HCL 0.4 MG/ML
0.2 VIAL (ML) INJECTION AS NEEDED
Status: DISCONTINUED | OUTPATIENT
Start: 2022-02-15 | End: 2022-02-15 | Stop reason: HOSPADM

## 2022-02-15 RX ORDER — SODIUM CHLORIDE 0.9 % (FLUSH) 0.9 %
10 SYRINGE (ML) INJECTION AS NEEDED
Status: DISCONTINUED | OUTPATIENT
Start: 2022-02-15 | End: 2022-02-15

## 2022-02-15 RX ORDER — SODIUM CHLORIDE 0.9 % (FLUSH) 0.9 %
3-10 SYRINGE (ML) INJECTION AS NEEDED
Status: DISCONTINUED | OUTPATIENT
Start: 2022-02-15 | End: 2022-02-15

## 2022-02-15 RX ORDER — CHOLECALCIFEROL (VITAMIN D3) 125 MCG
5 CAPSULE ORAL NIGHTLY PRN
Status: DISCONTINUED | OUTPATIENT
Start: 2022-02-15 | End: 2022-02-16 | Stop reason: HOSPADM

## 2022-02-15 RX ORDER — MORPHINE SULFATE 2 MG/ML
1 INJECTION, SOLUTION INTRAMUSCULAR; INTRAVENOUS EVERY 4 HOURS PRN
Status: DISCONTINUED | OUTPATIENT
Start: 2022-02-15 | End: 2022-02-16 | Stop reason: HOSPADM

## 2022-02-15 RX ORDER — DIPHENHYDRAMINE HCL 25 MG
25 CAPSULE ORAL
Status: DISCONTINUED | OUTPATIENT
Start: 2022-02-15 | End: 2022-02-15 | Stop reason: HOSPADM

## 2022-02-15 RX ORDER — KETOROLAC TROMETHAMINE 30 MG/ML
30 INJECTION, SOLUTION INTRAMUSCULAR; INTRAVENOUS ONCE
Status: COMPLETED | OUTPATIENT
Start: 2022-02-15 | End: 2022-02-15

## 2022-02-15 RX ORDER — NALOXONE HCL 0.4 MG/ML
0.4 VIAL (ML) INJECTION
Status: DISCONTINUED | OUTPATIENT
Start: 2022-02-15 | End: 2022-02-16 | Stop reason: HOSPADM

## 2022-02-15 RX ORDER — FAMOTIDINE 20 MG/1
40 TABLET, FILM COATED ORAL DAILY
Status: DISCONTINUED | OUTPATIENT
Start: 2022-02-16 | End: 2022-02-16 | Stop reason: HOSPADM

## 2022-02-15 RX ORDER — ACETAMINOPHEN 160 MG/5ML
650 SOLUTION ORAL EVERY 4 HOURS PRN
Status: DISCONTINUED | OUTPATIENT
Start: 2022-02-15 | End: 2022-02-16 | Stop reason: HOSPADM

## 2022-02-15 RX ORDER — HYDROCODONE BITARTRATE AND ACETAMINOPHEN 7.5; 325 MG/1; MG/1
1 TABLET ORAL EVERY 6 HOURS PRN
Status: DISCONTINUED | OUTPATIENT
Start: 2022-02-15 | End: 2022-02-16 | Stop reason: HOSPADM

## 2022-02-15 RX ORDER — SODIUM CHLORIDE 0.9 % (FLUSH) 0.9 %
10 SYRINGE (ML) INJECTION AS NEEDED
Status: DISCONTINUED | OUTPATIENT
Start: 2022-02-15 | End: 2022-02-16 | Stop reason: HOSPADM

## 2022-02-15 RX ORDER — SODIUM CHLORIDE, SODIUM LACTATE, POTASSIUM CHLORIDE, CALCIUM CHLORIDE 600; 310; 30; 20 MG/100ML; MG/100ML; MG/100ML; MG/100ML
INJECTION, SOLUTION INTRAVENOUS CONTINUOUS PRN
Status: DISCONTINUED | OUTPATIENT
Start: 2022-02-15 | End: 2022-02-15 | Stop reason: SURG

## 2022-02-15 RX ORDER — DIPHENHYDRAMINE HYDROCHLORIDE 50 MG/ML
12.5 INJECTION INTRAMUSCULAR; INTRAVENOUS
Status: DISCONTINUED | OUTPATIENT
Start: 2022-02-15 | End: 2022-02-15 | Stop reason: HOSPADM

## 2022-02-15 RX ORDER — LABETALOL HYDROCHLORIDE 5 MG/ML
5 INJECTION, SOLUTION INTRAVENOUS
Status: DISCONTINUED | OUTPATIENT
Start: 2022-02-15 | End: 2022-02-15 | Stop reason: HOSPADM

## 2022-02-15 RX ORDER — INSULIN LISPRO 100 [IU]/ML
5 INJECTION, SOLUTION INTRAVENOUS; SUBCUTANEOUS ONCE
Status: DISCONTINUED | OUTPATIENT
Start: 2022-02-15 | End: 2022-02-15

## 2022-02-15 RX ORDER — HYDROCODONE BITARTRATE AND ACETAMINOPHEN 7.5; 325 MG/1; MG/1
1 TABLET ORAL ONCE AS NEEDED
Status: DISCONTINUED | OUTPATIENT
Start: 2022-02-15 | End: 2022-02-15 | Stop reason: HOSPADM

## 2022-02-15 RX ORDER — ONDANSETRON 2 MG/ML
4 INJECTION INTRAMUSCULAR; INTRAVENOUS ONCE AS NEEDED
Status: DISCONTINUED | OUTPATIENT
Start: 2022-02-15 | End: 2022-02-15 | Stop reason: HOSPADM

## 2022-02-15 RX ORDER — HYDROMORPHONE HYDROCHLORIDE 1 MG/ML
0.5 INJECTION, SOLUTION INTRAMUSCULAR; INTRAVENOUS; SUBCUTANEOUS
Status: DISCONTINUED | OUTPATIENT
Start: 2022-02-15 | End: 2022-02-15 | Stop reason: HOSPADM

## 2022-02-15 RX ORDER — IBUPROFEN 600 MG/1
600 TABLET ORAL ONCE AS NEEDED
Status: DISCONTINUED | OUTPATIENT
Start: 2022-02-15 | End: 2022-02-15 | Stop reason: HOSPADM

## 2022-02-15 RX ORDER — DEXTROSE MONOHYDRATE 25 G/50ML
25 INJECTION, SOLUTION INTRAVENOUS
Status: DISCONTINUED | OUTPATIENT
Start: 2022-02-15 | End: 2022-02-16 | Stop reason: HOSPADM

## 2022-02-15 RX ADMIN — DOCUSATE SODIUM 50MG AND SENNOSIDES 8.6MG 2 TABLET: 8.6; 5 TABLET, FILM COATED ORAL at 23:10

## 2022-02-15 RX ADMIN — CEFTRIAXONE 1 G: 1 INJECTION, POWDER, FOR SOLUTION INTRAMUSCULAR; INTRAVENOUS at 23:08

## 2022-02-15 RX ADMIN — CARVEDILOL 3.12 MG: 3.12 TABLET, FILM COATED ORAL at 23:09

## 2022-02-15 RX ADMIN — CEFAZOLIN SODIUM 2 G: 2 INJECTION, SOLUTION INTRAVENOUS at 12:28

## 2022-02-15 RX ADMIN — FENTANYL CITRATE 50 MCG: 0.05 INJECTION, SOLUTION INTRAMUSCULAR; INTRAVENOUS at 12:42

## 2022-02-15 RX ADMIN — SODIUM CHLORIDE, POTASSIUM CHLORIDE, SODIUM LACTATE AND CALCIUM CHLORIDE: 600; 310; 30; 20 INJECTION, SOLUTION INTRAVENOUS at 12:05

## 2022-02-15 RX ADMIN — KETOROLAC TROMETHAMINE 30 MG: 30 INJECTION, SOLUTION INTRAMUSCULAR at 08:43

## 2022-02-15 RX ADMIN — ONDANSETRON 4 MG: 2 INJECTION INTRAMUSCULAR; INTRAVENOUS at 06:56

## 2022-02-15 RX ADMIN — SODIUM CHLORIDE, PRESERVATIVE FREE 10 ML: 5 INJECTION INTRAVENOUS at 20:53

## 2022-02-15 RX ADMIN — ONDANSETRON 4 MG: 2 INJECTION INTRAMUSCULAR; INTRAVENOUS at 13:04

## 2022-02-15 RX ADMIN — SODIUM CHLORIDE, POTASSIUM CHLORIDE, SODIUM LACTATE AND CALCIUM CHLORIDE 9 ML/HR: 600; 310; 30; 20 INJECTION, SOLUTION INTRAVENOUS at 12:28

## 2022-02-15 RX ADMIN — DEXAMETHASONE SODIUM PHOSPHATE 8 MG: 10 INJECTION INTRAMUSCULAR; INTRAVENOUS at 12:45

## 2022-02-15 RX ADMIN — CEFAZOLIN SODIUM 2 G: 2 INJECTION, SOLUTION INTRAVENOUS at 12:49

## 2022-02-15 RX ADMIN — CILOSTAZOL 100 MG: 100 TABLET ORAL at 23:10

## 2022-02-15 RX ADMIN — MORPHINE SULFATE 4 MG: 2 INJECTION, SOLUTION INTRAMUSCULAR; INTRAVENOUS at 06:56

## 2022-02-15 RX ADMIN — SODIUM CHLORIDE 1000 ML: 9 INJECTION, SOLUTION INTRAVENOUS at 06:55

## 2022-02-15 RX ADMIN — PROPOFOL 200 MG: 10 INJECTION, EMULSION INTRAVENOUS at 12:42

## 2022-02-15 RX ADMIN — SODIUM CHLORIDE 100 ML/HR: 9 INJECTION, SOLUTION INTRAVENOUS at 20:53

## 2022-02-15 RX ADMIN — LIDOCAINE HYDROCHLORIDE 80 MG: 20 INJECTION, SOLUTION INFILTRATION; PERINEURAL at 12:42

## 2022-02-15 RX ADMIN — FAMOTIDINE 20 MG: 10 INJECTION, SOLUTION INTRAVENOUS at 12:27

## 2022-02-15 NOTE — ANESTHESIA POSTPROCEDURE EVALUATION
Patient: Justice Aldridge    Procedure Summary     Date: 02/15/22 Room / Location: [facility] OR 01 / [facility] MAIN OR    Anesthesia Start: 1234 Anesthesia Stop: 1318    Procedure: LT. CYSTOSCOPY URETERAL CATHETER/STENT INSERTION (Left ) Diagnosis:     Surgeons: Jere Nielsen Jr., MD Provider: Pop Arango MD    Anesthesia Type: general ASA Status: 4          Anesthesia Type: general    Vitals  Vitals Value Taken Time   /80 02/15/22 1321   Temp 36.7 °C (98.1 °F) 02/15/22 1314   Pulse 76 02/15/22 1324   Resp 14 02/15/22 1320   SpO2 96 % 02/15/22 1324   Vitals shown include unvalidated device data.        Post Anesthesia Care and Evaluation    Patient location during evaluation: PACU  Patient participation: complete - patient participated  Level of consciousness: awake and alert  Pain management: adequate  Airway patency: patent  Anesthetic complications: No anesthetic complications    Cardiovascular status: acceptable  Respiratory status: acceptable  Hydration status: acceptable    Comments: --------------------            02/15/22               1320     --------------------   BP:       109/80     Pulse:      80       Resp:       14       Temp:                SpO2:      96%      --------------------

## 2022-02-15 NOTE — OP NOTE
PREOPERATIVE DIAGNOSIS: Left proximal ureteral calculus    POSTOPERATIVE DIAGNOSIS: Same    PROCEDURE: Cystoscopy with left stent placement    SURGEON:  Jere Nielsen Jr., M.D.    ASSISTANT SURGEON: None    ANESTHESIA: General    HISTORY: The patient is a 66-year-old male with left flank pain who upon evaluation was found of 8 mm stone in the proximal left ureter.  He presents for treatment.    DESCRIPTION OF PROCEDURE:  .  General anesthesia was obtained and he was placed in a lithotomy position he was prepped and draped in sterile fashion and received Ancef preoperatively a 21 Romanian cystoscope was placed per urethra into the bladder.  Examination of the prostate showed enlargement consistent with BPH.  Were no tumors or stones in the bladder the ureteral catheter was placed into the left ureteral orifice and to the level of the mid ureter.  Irrigation with saline was then performed in an attempt to remove the stone in a more proximal position.  A guidewire was then placed and a 6 Romanian 26 cm double-J stent was passed into position with the proximal end in the kidney and the distal end in the bladder.  Bladder was drained the cystoscope was removed and the patient was taken to the recovery room.  Left-sided stone will eventually be treated with shockwave lithotripsy.

## 2022-02-15 NOTE — ANESTHESIA PREPROCEDURE EVALUATION
Anesthesia Evaluation     Patient summary reviewed and Nursing notes reviewed                Airway   Mallampati: III  Neck ROM: limited  Possible difficult intubation  Dental      Pulmonary    (+) a smoker Current Smoked day of surgery,   Cardiovascular     ECG reviewed  PT is on anticoagulation therapy  Patient on routine beta blocker and Beta blocker given within 24 hours of surgery  Rhythm: irregular  Rate: normal    (+) hypertension, CAD, dysrhythmias PAC, PVD, hyperlipidemia,  carotid artery disease      Neuro/Psych- negative ROS  GI/Hepatic/Renal/Endo    (+) morbid obesity,  renal disease CRI and stones, diabetes mellitus type 2,     Musculoskeletal (-) negative ROS    Abdominal    Substance History - negative use     OB/GYN negative ob/gyn ROS         Other                        Anesthesia Plan    ASA 4     general   (I have reviewed the patient's history with the patient and the chart, including all pertinent laboratory results and imaging. I have explained the risks of anesthesia including but not limited to dental damage, corneal abrasion, nerve injury, MI, stroke, and death. Questions asked and answered. Anesthetic plan discussed with patient and team as indicated. Patient expressed understanding of the above.    LMA)  intravenous induction     Anesthetic plan, all risks, benefits, and alternatives have been provided, discussed and informed consent has been obtained with: patient.        CODE STATUS:

## 2022-02-15 NOTE — PLAN OF CARE
Goal Outcome Evaluation:           Progress: improving  Outcome Summary: Rec'd from PACU for Lt stent placement r/t large kidney stone. Observation overnight. NS @ 75//hr. Up ad tati. Straining urine.

## 2022-02-15 NOTE — ANESTHESIA PROCEDURE NOTES
Airway  Urgency: elective    Date/Time: 2/15/2022 12:42 PM  Airway not difficult    General Information and Staff    Patient location during procedure: OR  Anesthesiologist: Pop Arango MD  CRNA: Angelito Connors CRNA    Indications and Patient Condition  Indications for airway management: airway protection    Preoxygenated: yes  MILS not maintained throughout  Mask difficulty assessment: 1 - vent by mask    Final Airway Details  Final airway type: supraglottic airway      Successful airway: unique and LMA  Size 5    Number of attempts at approach: 1  Assessment: lips, teeth, and gum same as pre-op    Additional Comments  Pre O2, SIAI

## 2022-02-15 NOTE — ED PROVIDER NOTES
EMERGENCY DEPARTMENT ENCOUNTER    Room Number:  [facility]/MAIN OR  Date seen:  2/15/2022  PCP: Aretha Hdez APRN  Historian: Patient      HPI:  Chief Complaint: Abdominal pain, testicle pain  A complete HPI/ROS/PMH/PSH/SH/FH are unobtainable due to: Nothing  Context: Justice Aldridge is a 66 y.o. male who presents to the ED c/o pain in his lower abdomen and both testicles.  The pain started at 9 PM on Sunday night while he was watching the Super Bowl.  He has had nausea but no vomiting.  He has had decreased appetite.  The pain was constant throughout the day yesterday.  He does have a history of kidney stones.  He denies fever or chills.  His pain was not alleviated much by morphine here.  He has had nothing to eat or drink today.            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) 11/16/2016   • Hypertension associated with chronic kidney disease due to type 2 diabetes mellitus (HCC) 11/16/2016   • Hyperlipemia 11/16/2016   • Atherosclerosis with claudication of extremity ([facility]) 11/16/2016   • Coronary artery disease involving native coronary artery without angina pectoris 11/16/2016     Resolved Ambulatory Problems     Diagnosis Date Noted   • No Resolved Ambulatory Problems     Past Medical History:   Diagnosis Date   • Carotid artery stenosis    • Diabetes mellitus (HCC)    • Hyperlipidemia    • Hypertension    • Peripheral vascular disease (HCC)    • Psoriasis          PAST SURGICAL HISTORY  Past Surgical History:   Procedure Laterality Date   • FINGER SURGERY Left    • KNEE ARTHROSCOPY     • KNEE SURGERY Left    • ID THROMBOENDARTECTMY NECK,NECK INCIS Right 11/16/2016    Procedure: RIGHT CAROTID ENDARTERECTOMY;  Surgeon: Roger Michael MD;  Location:   [facility];  Service: Vascular         FAMILY HISTORY  Family History   Problem Relation Age of Onset   • Valvular heart disease Mother    • Heart disease Father    • Cancer Paternal Grandfather          SOCIAL HISTORY  Social History     Socioeconomic History   • Marital status: Single   Tobacco Use   • Smoking status: Current Every Day Smoker     Packs/day: 1.00     Years: 30.00     Pack years: 30.00     Types: Cigarettes   • Smokeless tobacco: Never Used   Substance and Sexual Activity   • Alcohol use: No     Comment: OCC   • Drug use: No   • Sexual activity: Defer         ALLERGIES  Erythromycin        REVIEW OF SYSTEMS  Review of Systems   Review of all 14 systems is negative other than stated in the HPI above.      PHYSICAL EXAM  ED Triage Vitals   Temp Heart Rate Resp BP SpO2   02/15/22 0224 02/15/22 0224 02/15/22 0224 02/15/22 0227 02/15/22 0224   98.7 °F (37.1 °C) 93 16 160/98 98 %      Temp src Heart Rate Source Patient Position BP Location FiO2 (%)   02/15/22 0224 02/15/22 0224 02/15/22 0227 02/15/22 0227 --   Temporal Monitor Standing Left arm          GENERAL: Awake and alert, no acute distress  HENT: nares patent  EYES: no scleral icterus  CV: regular rhythm, normal rate  RESPIRATORY: normal effort  ABDOMEN: soft, nondistended, nontender throughout  : Normal phallus and scrotum, testes nontender bilaterally  MUSCULOSKELETAL: no deformity  NEURO: alert, moves all extremities, follows commands  PSYCH:  calm, cooperative  SKIN: warm, dry    Vital signs and nursing notes reviewed.          LAB RESULTS  Recent Results (from the past 24 hour(s))   Comprehensive Metabolic Panel    Collection Time: 02/15/22  5:14 AM    Specimen: Blood   Result Value Ref Range    Glucose 268 (H) 65 - 99 mg/dL    BUN 20 8 - 23 mg/dL    Creatinine 1.39 (H) 0.76 - 1.27 mg/dL    Sodium 133 (L) 136 - 145 mmol/L    Potassium 4.2 3.5 - 5.2 mmol/L    Chloride 99 98 - 107 mmol/L    CO2 21.0 (L) 22.0 - 29.0 mmol/L    Calcium 9.6 8.6 -  10.5 mg/dL    Total Protein 7.5 6.0 - 8.5 g/dL    Albumin 4.10 3.50 - 5.20 g/dL    ALT (SGPT) 26 1 - 41 U/L    AST (SGOT) 25 1 - 40 U/L    Alkaline Phosphatase 132 (H) 39 - 117 U/L    Total Bilirubin 0.5 0.0 - 1.2 mg/dL    eGFR Non African Amer 51 (L) >60 mL/min/1.73    Globulin 3.4 gm/dL    A/G Ratio 1.2 g/dL    BUN/Creatinine Ratio 14.4 7.0 - 25.0    Anion Gap 13.0 5.0 - 15.0 mmol/L   Lipase    Collection Time: 02/15/22  5:14 AM    Specimen: Blood   Result Value Ref Range    Lipase 33 13 - 60 U/L   Green Top (Gel)    Collection Time: 02/15/22  5:14 AM   Result Value Ref Range    Extra Tube Hold for add-ons.    Lavender Top    Collection Time: 02/15/22  5:14 AM   Result Value Ref Range    Extra Tube hold for add-on    CBC Auto Differential    Collection Time: 02/15/22  5:14 AM    Specimen: Blood   Result Value Ref Range    WBC 16.78 (H) 3.40 - 10.80 10*3/mm3    RBC 5.41 4.14 - 5.80 10*6/mm3    Hemoglobin 17.3 13.0 - 17.7 g/dL    Hematocrit 49.0 37.5 - 51.0 %    MCV 90.6 79.0 - 97.0 fL    MCH 32.0 26.6 - 33.0 pg    MCHC 35.3 31.5 - 35.7 g/dL    RDW 12.5 12.3 - 15.4 %    RDW-SD 40.3 37.0 - 54.0 fl    MPV 9.7 6.0 - 12.0 fL    Platelets 191 140 - 450 10*3/mm3    Neutrophil % 74.2 42.7 - 76.0 %    Lymphocyte % 12.6 (L) 19.6 - 45.3 %    Monocyte % 10.1 5.0 - 12.0 %    Eosinophil % 2.3 0.3 - 6.2 %    Basophil % 0.4 0.0 - 1.5 %    Immature Grans % 0.4 0.0 - 0.5 %    Neutrophils, Absolute 12.45 (H) 1.70 - 7.00 10*3/mm3    Lymphocytes, Absolute 2.12 0.70 - 3.10 10*3/mm3    Monocytes, Absolute 1.69 (H) 0.10 - 0.90 10*3/mm3    Eosinophils, Absolute 0.39 0.00 - 0.40 10*3/mm3    Basophils, Absolute 0.06 0.00 - 0.20 10*3/mm3    Immature Grans, Absolute 0.07 (H) 0.00 - 0.05 10*3/mm3    nRBC 0.0 0.0 - 0.2 /100 WBC   Urinalysis With Microscopic If Indicated (No Culture) - Urine, Clean Catch    Collection Time: 02/15/22  5:24 AM    Specimen: Urine, Clean Catch   Result Value Ref Range    Color, UA Yellow Yellow, Straw    Appearance,  UA Clear Clear    pH, UA <=5.0 5.0 - 8.0    Specific Gravity, UA >=1.030 1.005 - 1.030    Glucose, UA >=1000 mg/dL (3+) (A) Negative    Ketones, UA 15 mg/dL (1+) (A) Negative    Bilirubin, UA Negative Negative    Blood, UA Moderate (2+) (A) Negative    Protein, UA Trace (A) Negative    Leuk Esterase, UA Negative Negative    Nitrite, UA Negative Negative    Urobilinogen, UA 0.2 E.U./dL 0.2 - 1.0 E.U./dL   Urinalysis, Microscopic Only - Urine, Clean Catch    Collection Time: 02/15/22  5:24 AM    Specimen: Urine, Clean Catch   Result Value Ref Range    RBC, UA 6-12 (A) None Seen, 0-2 /HPF    WBC, UA 0-2 None Seen, 0-2 /HPF    Bacteria, UA None Seen None Seen /HPF    Squamous Epithelial Cells, UA 0-2 None Seen, 0-2 /HPF    Hyaline Casts, UA None Seen None Seen /LPF    Methodology Automated Microscopy    COVID-19, [facility] IN-HOUSE CEPHEID/JOSÉ MIGUEL NP SWAB IN TRANSPORT MEDIA 8-12 HR TAT - Swab, Nasopharynx    Collection Time: 02/15/22  8:41 AM    Specimen: Nasopharynx; Swab   Result Value Ref Range    COVID19 Not Detected Not Detected - Ref. Range   POC Glucose Once    Collection Time: 02/15/22  8:59 AM    Specimen: Blood   Result Value Ref Range    Glucose 197 (H) 70 - 130 mg/dL   POC Glucose Once    Collection Time: 02/15/22 11:24 AM    Specimen: Blood   Result Value Ref Range    Glucose 189 (H) 70 - 130 mg/dL       Ordered the above labs and reviewed the results.        RADIOLOGY  CT Abdomen Pelvis Without Contrast    Result Date: 2/15/2022  CT ABDOMEN AND PELVIS WITHOUT IV CONTRAST  HISTORY: Lower abdominal/testicular pain  TECHNIQUE: Radiation dose reduction techniques were utilized, including automated exposure control and exposure modulation based on body size. 3 mm images were obtained through the abdomen and pelvis without IV contrast.  COMPARISON: CT abdomen and pelvis 01/14/2017  FINDINGS: The distal esophagus has a thickened appearance, similar to that seen 2017. There are no findings of small bowel obstruction.  No  abdominopelvic adenopathy by size criteria is present. The appendix has a mildly prominent appearance without significant periappendiceal fat stranding, likely physiologic. The bladder is decompressed and cannot be evaluated. No free intraperitoneal air is seen. Subcentimeter hepatic lesions are too small to characterize.  The gallbladder, pancreas, spleen and adrenal glands have an unremarkable noncontrast CT appearance.  There is a 8 x 8 mm calculus within the left proximal ureter with moderate upstream hydroureteronephrosis. Significant asymmetric fat stranding and soft tissue thickening surrounds the left ureter and kidney. Large calculus within a left mid pole renal calyx measures 9 mm.  No suspicious lytic blastic osseous lesions present.      1.  8 x 8 mm calculus within the left proximal ureter with moderate upstream hydroureteronephrosis. Asymmetric stranding surrounding the left kidney and ureter represents sequela of obstructive uropathy and/or coexistent infection in the appropriate clinical context and correlation with patient history and urinalysis is recommended. 2.  Thickening of distal esophagus, similar to that seen 2017, suggestive of esophagitis in the appropriate context correlation patient history is recommended with follow-up endoscopy if clinically indicated. 3.  Other findings as above  This report was finalized on 2/15/2022 7:22 AM by Dr. Jose M Daniels M.D.      US Scrotum & Testicles    Result Date: 2/15/2022  Patient: MARIA C ROSSI  Time Out: 06:57 Exam(s): US SCROTAL, US OTHER EXAM:   US Scrotum CLINICAL HISTORY:    Reason for exam: Testicular Scrotal Pain. Testicular Scrotal Pain. TECHNIQUE:   Real-time ultrasound of the scrotum with color Doppler and image documentation. COMPARISON:   No relevant prior studies available. FINDINGS:   Right testicle: Measures 4.5 x 2.2 x 2.6 cm.  No mass.  No torsion.  Small right hydrocele.   Left testicle: Measures 4.6 x 3.0 x 1.8 cm.  No mass.  No  torsion.  Left-sided testicular cyst measuring up to 1.2 cm   Epididymides: Left-sided epididymal head cyst measuring up to 1.5 cm.  2 right-sided epididymal head cyst measuring up to 8 mm.  Mild hyperemia of the right epididymal tail.   Scrotum:  Unremarkable. IMPRESSION:     Mild hyperemia of the right epididymal tail which may be due to epididymitis.  No evidence of torsion bilaterally.     Electronically signed by Lv Martinez M.D. on 02-15-22 at 0657    US Testicular or Ovarian Vascular Limited    Result Date: 2/15/2022  Patient: MARIA C ROSSI  Time Out: 06:57 Exam(s): US SCROTAL, US OTHER EXAM:   US Scrotum CLINICAL HISTORY:    Reason for exam: Testicular Scrotal Pain. Testicular Scrotal Pain. TECHNIQUE:   Real-time ultrasound of the scrotum with color Doppler and image documentation. COMPARISON:   No relevant prior studies available. FINDINGS:   Right testicle: Measures 4.5 x 2.2 x 2.6 cm.  No mass.  No torsion.  Small right hydrocele.   Left testicle: Measures 4.6 x 3.0 x 1.8 cm.  No mass.  No torsion.  Left-sided testicular cyst measuring up to 1.2 cm   Epididymides: Left-sided epididymal head cyst measuring up to 1.5 cm.  2 right-sided epididymal head cyst measuring up to 8 mm.  Mild hyperemia of the right epididymal tail.   Scrotum:  Unremarkable. IMPRESSION:     Mild hyperemia of the right epididymal tail which may be due to epididymitis.  No evidence of torsion bilaterally.     Electronically signed by Lv Martinez M.D. on 02-15-22 at 0657      Ordered the above noted radiological studies. Reviewed by me in PACS.            PROCEDURES  Procedures              MEDICATIONS GIVEN IN ER  Medications   sodium chloride 0.9 % flush 10 mL ( Intravenous MAR Hold 2/15/22 1115)   sodium chloride 0.9 % infusion (has no administration in time range)   insulin lispro (ADMELOG) injection 5 Units ( Subcutaneous MAR Hold 2/15/22 1115)   sodium chloride 0.9 % bolus 1,000 mL (0 mL Intravenous Stopped 2/15/22 0838)    ondansetron (ZOFRAN) injection 4 mg (4 mg Intravenous Given 2/15/22 0656)   morphine injection 4 mg (4 mg Intravenous Given 2/15/22 0656)   ketorolac (TORADOL) injection 30 mg (30 mg Intravenous Given 2/15/22 0843)                   MEDICAL DECISION MAKING, PROGRESS, and CONSULTS    All labs have been independently reviewed by me.  All radiology studies have been reviewed by me and discussed with radiologist dictating the report.   EKG's independently viewed and interpreted by me.  Discussion below represents my analysis of pertinent findings related to patient's condition, differential diagnosis, treatment plan and final disposition.      Differential diagnosis includes but is not limited to:  Epididymitis  Orchitis  Ureteral calculus  Cystitis  Pyelonephritis  Testicular torsion      ED Course as of 02/15/22 1220   Tue Feb 15, 2022   0630 I performed a quick assessment on  prior to my partner, Dr. Somers, seeing him for full evaluation.  The patient complains of lower abdominal discomfort radiating into both testicles.  He denies any swelling of his testicles or any fevers and chills.  Examination showed mild tenderness to his lower abdomen but a normal  examination with normal, nonswollen, testicles.  He has had a testicular ultrasound as well as labs performed.  We will order a CT scan to assess for ureterolithiasis.  Care will be begun by Dr. Staples at 7 AM [BM]   0714 Creatinine(!): 1.39  stable [JR]   0714 WBC(!): 16.78 [JR]   0714 Bacteria, UA: None Seen [JR]   0714 WBC, UA: 0-2 [JR]   0717 CT abdomen independently interpreted PACs.  There is a proximal left ureteral calculus with moderate left hydronephrosis. [JR]   0811 Discussed with Dr. Nielsen, urologist, who requested patient be admitted to the hospitalist and he will plan on left ureteral stent placement sometime today.  Unfortunately lithotripsy is not available today. [JR]   0958 Discussed with Josie Sharif [facility] who accepts admission  on behalf of Dr. Sharif. [JR]      ED Course User Index  [BM] Robby Carranza MD  [JR] Sharif Staples MD              I wore an N95 mask, face shield, and gloves during this patient encounter.  Patient also wearing a surgical mask.  Hand hygeine performed before and after seeing the patient.    DIAGNOSIS  Final diagnoses:   Left ureteral calculus   Leukocytosis, unspecified type   Hyperglycemia   Chronic kidney disease, unspecified CKD stage         DISPOSITION  ADMIT            Latest Documented Vital Signs:  As of 12:20 EST  BP- 134/74 HR- 78 Temp- 98 °F (36.7 °C) (Oral) O2 sat- 96%        --    Please note that portions of this were completed with a voice recognition program.          Sharif Staples MD  02/15/22 8645

## 2022-02-15 NOTE — PROGRESS NOTES
2-15-22  Urology  Left proximal stone, 8 mm  Stent placed today  Plan: home tomorrow am, will arrange outpatient lithotripsy of the left sided stone

## 2022-02-15 NOTE — ED PROVIDER NOTES
EMERGENCY DEPARTMENT ENCOUNTER    CHIEF COMPLAINT  Chief Complaint: ***  History given by: ***  History limited by: ***  Room Number: 15/15  PMD: Aretha Hdez APRN      HPI:  Pt is a 66 y.o. male who presents complaining of ***    Duration:  ***  Onset: ***  Timing: ***  Location: ***  Radiation: ***  Quality: ***  Intensity/Severity: ***  Progression: ***  Associated Symptoms: ***  Aggravating Factors: ***  Alleviating Factors: ***  Previous Episodes: ***  Treatment before arrival: ***    PAST MEDICAL HISTORY  Active Ambulatory Problems     Diagnosis Date Noted   • Asymptomatic stenosis of right carotid artery without infarction 11/16/2016   • Chronic renal insufficiency, stage II (mild) 11/16/2016   • Type 2 diabetes mellitus with diabetic neuropathic arthropathy ([facility]) 11/16/2016   • Hypertension associated with chronic kidney disease due to type 2 diabetes mellitus ([facility]) 11/16/2016   • Hyperlipemia 11/16/2016   • Atherosclerosis with claudication of extremity ([facility]) 11/16/2016   • Coronary artery disease involving native coronary artery without angina pectoris 11/16/2016     Resolved Ambulatory Problems     Diagnosis Date Noted   • No Resolved Ambulatory Problems     Past Medical History:   Diagnosis Date   • Carotid artery stenosis    • Diabetes mellitus (CMS/[facility])    • Hyperlipidemia    • Hypertension    • Peripheral vascular disease (CMS/[facility])    • Psoriasis        PAST SURGICAL HISTORY  Past Surgical History:   Procedure Laterality Date   • FINGER SURGERY Left    • KNEE ARTHROSCOPY     • KNEE SURGERY Left    • RI THROMBOENDARTECTMY NECK,NECK INCIS Right 11/16/2016    Procedure: RIGHT CAROTID ENDARTERECTOMY;  Surgeon: Roger Michael MD;  Location: [facility];  Service: Vascular       FAMILY HISTORY  Family History   Problem Relation Age of Onset   • Valvular heart disease Mother    • Heart disease Father    • Cancer Paternal Grandfather        SOCIAL HISTORY  Social History     Socioeconomic  History   • Marital status: Single   Tobacco Use   • Smoking status: Current Every Day Smoker     Packs/day: 1.00     Years: 30.00     Pack years: 30.00     Types: Cigarettes   • Smokeless tobacco: Never Used   Substance and Sexual Activity   • Alcohol use: No     Comment: OCC   • Drug use: No   • Sexual activity: Defer       ALLERGIES  Erythromycin    REVIEW OF SYSTEMS  Review of Systems    PHYSICAL EXAM  ED Triage Vitals   Temp Heart Rate Resp BP SpO2   02/15/22 0224 02/15/22 0224 02/15/22 0224 02/15/22 0227 02/15/22 0224   98.7 °F (37.1 °C) 93 16 160/98 98 %      Temp src Heart Rate Source Patient Position BP Location FiO2 (%)   02/15/22 0224 02/15/22 0224 02/15/22 0227 02/15/22 0227 --   Temporal Monitor Standing Left arm        Physical Exam    LAB RESULTS  Lab Results (last 24 hours)     ** No results found for the last 24 hours. **          I ordered the above labs and reviewed the results    RADIOLOGY  US Testicular or Ovarian Vascular Limited    (Results Pending)   US Scrotum & Testicles    (Results Pending)        I ordered the above noted radiological studies. Interpreted by radiologist. Discussed with radiologist (***). Reviewed by me in PACS.       PROCEDURES  Procedures      PROGRESS AND CONSULTS     The patient was wearing a facemask upon entrance into the room and remained in such throughout their visit.  I was wearing PPE including a facemask, eye protection, as well as gloves at any point entering the room and throughout the visit      MEDICAL DECISION MAKING  Results were reviewed/discussed with the patient and they were also made aware of online access. Pt also made aware that some labs, such as cultures, will not be resulted during ER visit and follow up with PMD is necessary.     MDM  Number of Diagnoses or Management Options     Amount and/or Complexity of Data Reviewed  Clinical lab tests: ordered and reviewed  Tests in the radiology section of CPT®: ordered and reviewed  Review and summarize  past medical records: yes (Upon medical records review, the patient was last seen and evaluated on 1/14/2017 secondary to an abdominal wall strain)           DIAGNOSIS  Final diagnoses:   None       DISPOSITION  ***    Latest Documented Vital Signs:  As of 04:12 EST  BP- 160/98 HR- 93 Temp- 98.7 °F (37.1 °C) (Temporal) O2 sat- 98%

## 2022-02-15 NOTE — CONSULTS
FIRST UROLOGY CONSULT      Patient Identification:  NAME:  Justice Aldridge  Age:  66 y.o.   Sex:  male   :  1955   MRN:  [id number]       Chief complaint: flank pain    History of present illness:  66 years old  Flank pain  Left side  2 days  Moderate to severe  Nausea  CT scan: left 8 mm proximal stone w hydro  WBC elevated  Urology consulted      Past medical history:  Past Medical History:   Diagnosis Date   • Carotid artery stenosis    • Diabetes mellitus (CMS/HCC)    • Hyperlipidemia    • Hypertension    • Peripheral vascular disease (CMS/HCC)    • Psoriasis     ELBOWS MIKE       Past surgical history:  Past Surgical History:   Procedure Laterality Date   • FINGER SURGERY Left    • KNEE ARTHROSCOPY     • KNEE SURGERY Left    • [place] THROMBOENDARTECTMY NECK,NECK INCIS Right 2016    Procedure: RIGHT CAROTID ENDARTERECTOMY;  Surgeon: Roger Michael MD;  Location: [facility];  Service: Vascular       Allergies:  Erythromycin    Home medications:  (Not in a hospital admission)       Hospital medications:  insulin lispro, 5 Units, Subcutaneous, Once  ketorolac, 30 mg, Intravenous, Once      sodium chloride, 125 mL/hr      sodium chloride    Family history:  Family History   Problem Relation Age of Onset   • Valvular heart disease Mother    • Heart disease Father    • Cancer Paternal Grandfather        Social history:  Social History     Tobacco Use   • Smoking status: Current Every Day Smoker     Packs/day: 1.00     Years: 30.00     Pack years: 30.00     Types: Cigarettes   • Smokeless tobacco: Never Used   Substance Use Topics   • Alcohol use: No     Comment: OCC   • Drug use: No       Review of systems:    gen no fever  Skin psoriasis  Musculoskeletal neg  Endocrine DM  Heart  HTN  Pulm neg  GI nausea   stone  Psych neg  Neuro neg          Objective:  TMax 24 hours:   Temp (24hrs), Av.7 °F (37.1 °C), Min:98.7 °F (37.1 °C), Max:98.7 °F (37.1 °C)      Vitals Ranges:   Temp:  [98.7 °F  (37.1 °C)] 98.7 °F (37.1 °C)  Heart Rate:  [85-93] 86  Resp:  [16] 16  BP: (150-174)/(92-98) 174/92    Intake/Output Last 3 shifts:  No intake/output data recorded.     Physical Exam:    General Appearance:    Alert, cooperative, NAD   HEENT:    No trauma, pupils reactive, hearing intact   Back:     No CVA tenderness   Lungs:     Respirations unlabored, no wheezing    Heart:    RRR, intact peripheral pulses   Abdomen:     Soft, NDNT, no masses, no guarding   :    Testes descended bilaterally, no nodules.  Penis normal.  No scrotal or penile rashes noted   Extremities:   No edema, no deformity   Lymphatic:   No neck or groin LAD   Skin:   No bleeding, bruising or rashes   Neuro/Psych:   Orientation intact, mood/affect pleasant, no focal findings       Results review:   I reviewed the patient's new clinical results.    Data review:  Lab Results (last 24 hours)     Procedure Component Value Units Date/Time    Marceline Draw [[id number]] Collected: 02/15/22 0514    Specimen: Blood Updated: 02/15/22 0615    Narrative:      The following orders were created for panel order Marceline Draw.  Procedure                               Abnormality         Status                     ---------                               -----------         ------                     Green Top (Gel)[[id number]]                                  Final result               Lavender Top[[id number]]                                     Final result               Gold Top - SST[[id number]]                                                              Light Blue Top[[id number]]                                                                Please view results for these tests on the individual orders.    Green Top (Gel) [[id number]] Collected: 02/15/22 0514    Specimen: Blood Updated: 02/15/22 0615     Extra Tube Hold for add-ons.     Comment: Auto resulted.       Lavender Top [[id number]] Collected: 02/15/22 0514    Specimen: Blood Updated: 02/15/22 0615      Extra Tube hold for add-on     Comment: Auto resulted       Comprehensive Metabolic Panel [[id number]]  (Abnormal) Collected: 02/15/22 0514    Specimen: Blood Updated: 02/15/22 0543     Glucose 268 mg/dL      BUN 20 mg/dL      Creatinine 1.39 mg/dL      Sodium 133 mmol/L      Potassium 4.2 mmol/L      Chloride 99 mmol/L      CO2 21.0 mmol/L      Calcium 9.6 mg/dL      Total Protein 7.5 g/dL      Albumin 4.10 g/dL      ALT (SGPT) 26 U/L      AST (SGOT) 25 U/L      Alkaline Phosphatase 132 U/L      Total Bilirubin 0.5 mg/dL      eGFR Non African Amer 51 mL/min/1.73      Globulin 3.4 gm/dL      A/G Ratio 1.2 g/dL      BUN/Creatinine Ratio 14.4     Anion Gap 13.0 mmol/L     Narrative:      GFR Normal >60  Chronic Kidney Disease <60  Kidney Failure <15      Lipase [[id number]]  (Normal) Collected: 02/15/22 0514    Specimen: Blood Updated: 02/15/22 0543     Lipase 33 U/L     Urinalysis With Microscopic If Indicated (No Culture) - Urine, Clean Catch [[id number]]  (Abnormal) Collected: 02/15/22 0524    Specimen: Urine, Clean Catch Updated: 02/15/22 0538     Color, UA Yellow     Appearance, UA Clear     pH, UA <=5.0     Specific Gravity, UA >=1.030     Glucose, UA >=1000 mg/dL (3+)     Ketones, UA 15 mg/dL (1+)     Bilirubin, UA Negative     Blood, UA Moderate (2+)     Protein, UA Trace     Leuk Esterase, UA Negative     Nitrite, UA Negative     Urobilinogen, UA 0.2 E.U./dL    Urinalysis, Microscopic Only - Urine, Clean Catch [[id number]]  (Abnormal) Collected: 02/15/22 0524    Specimen: Urine, Clean Catch Updated: 02/15/22 0538     RBC, UA 6-12 /HPF      WBC, UA 0-2 /HPF      Bacteria, UA None Seen /HPF      Squamous Epithelial Cells, UA 0-2 /HPF      Hyaline Casts, UA None Seen /LPF      Methodology Automated Microscopy    CBC & Differential [[id number]]  (Abnormal) Collected: 02/15/22 0514    Specimen: Blood Updated: 02/15/22 0521    Narrative:      The following orders were created for panel order CBC &  Differential.  Procedure                               Abnormality         Status                     ---------                               -----------         ------                     CBC Auto Differential[[id number]]        Abnormal            Final result                 Please view results for these tests on the individual orders.    CBC Auto Differential [[id number]]  (Abnormal) Collected: 02/15/22 0514    Specimen: Blood Updated: 02/15/22 0521     WBC 16.78 10*3/mm3      RBC 5.41 10*6/mm3      Hemoglobin 17.3 g/dL      Hematocrit 49.0 %      MCV 90.6 fL      MCH 32.0 pg      MCHC 35.3 g/dL      RDW 12.5 %      RDW-SD 40.3 fl      MPV 9.7 fL      Platelets 191 10*3/mm3      Neutrophil % 74.2 %      Lymphocyte % 12.6 %      Monocyte % 10.1 %      Eosinophil % 2.3 %      Basophil % 0.4 %      Immature Grans % 0.4 %      Neutrophils, Absolute 12.45 10*3/mm3      Lymphocytes, Absolute 2.12 10*3/mm3      Monocytes, Absolute 1.69 10*3/mm3      Eosinophils, Absolute 0.39 10*3/mm3      Basophils, Absolute 0.06 10*3/mm3      Immature Grans, Absolute 0.07 10*3/mm3      nRBC 0.0 /100 WBC            Imaging:  Imaging Results (Last 24 Hours)     Procedure Component Value Units Date/Time    CT Abdomen Pelvis Without Contrast [[id number]] Collected: 02/15/22 0707     Updated: 02/15/22 0725    Narrative:      CT ABDOMEN AND PELVIS WITHOUT IV CONTRAST     HISTORY: Lower abdominal/testicular pain     TECHNIQUE: Radiation dose reduction techniques were utilized, including  automated exposure control and exposure modulation based on body size.   3 mm images were obtained through the abdomen and pelvis without IV  contrast.      COMPARISON: CT abdomen and pelvis 01/14/2017     FINDINGS:  The distal esophagus has a thickened appearance, similar to that seen  2017. There are no findings of small bowel obstruction.     No abdominopelvic adenopathy by size criteria is present. The appendix  has a mildly prominent appearance without  significant periappendiceal  fat stranding, likely physiologic. The bladder is decompressed and  cannot be evaluated. No free intraperitoneal air is seen. Subcentimeter  hepatic lesions are too small to characterize.     The gallbladder, pancreas, spleen and adrenal glands have an  unremarkable noncontrast CT appearance.     There is a 8 x 8 mm calculus within the left proximal ureter with  moderate upstream hydroureteronephrosis. Significant asymmetric fat  stranding and soft tissue thickening surrounds the left ureter and  kidney. Large calculus within a left mid pole renal calyx measures 9 mm.     No suspicious lytic blastic osseous lesions present.       Impression:      1.  8 x 8 mm calculus within the left proximal ureter with moderate  upstream hydroureteronephrosis. Asymmetric stranding surrounding the  left kidney and ureter represents sequela of obstructive uropathy and/or  coexistent infection in the appropriate clinical context and correlation  with patient history and urinalysis is recommended.  2.  Thickening of distal esophagus, similar to that seen 2017,  suggestive of esophagitis in the appropriate context correlation patient  history is recommended with follow-up endoscopy if clinically indicated.  3.  Other findings as above     This report was finalized on 2/15/2022 7:22 AM by Dr. Jose M Daniels M.D.       US Scrotum & Testicles [[id number]] Collected: 02/15/22 0658     Updated: 02/15/22 0658    Narrative:        Patient: MARIA C ROSSI  Time Out: 06:57  Exam(s): US SCROTAL, US OTHER     EXAM:    US Scrotum    CLINICAL HISTORY:     Reason for exam: Testicular Scrotal Pain. Testicular Scrotal Pain.    TECHNIQUE:    Real-time ultrasound of the scrotum with color Doppler and image   documentation.    COMPARISON:    No relevant prior studies available.    FINDINGS:    Right testicle: Measures 4.5 x 2.2 x 2.6 cm.  No mass.  No torsion.    Small right hydrocele.    Left testicle: Measures 4.6 x 3.0 x  1.8 cm.  No mass.  No torsion.    Left-sided testicular cyst measuring up to 1.2 cm    Epididymides: Left-sided epididymal head cyst measuring up to 1.5 cm.    2 right-sided epididymal head cyst measuring up to 8 mm.  Mild hyperemia   of the right epididymal tail.    Scrotum:  Unremarkable.    IMPRESSION:       Mild hyperemia of the right epididymal tail which may be due to   epididymitis.  No evidence of torsion bilaterally.      Impression:          Electronically signed by Lv Martinez M.D. on 02-15-22 at 0657     Testicular or Ovarian Vascular Limited [[id number]] Resulted: 02/15/22 0512     Updated: 02/15/22 0513             Assessment:   Left proximal ureteral calculus      Plan:   Cystoscopy, left stent placement (and eventually left lithotripsy)  IRB discussed    Jere Nielsen Jr., MD  02/15/22  08:22 EST

## 2022-02-15 NOTE — ED NOTES
Pt ambulatory to triage from home with c/o abdominal and testicular pain for the last few days.  Pt states only able to urinate small amounts vs normal.  Pt wearing mask in triage. Triage personnel wore appropriate PPE    Pt recently started on cephelaxin for cellulitis on hand, but stopped taking them because of the stomach pain.       Susannah Urban, RN  02/15/22 4036

## 2022-02-16 VITALS
OXYGEN SATURATION: 95 % | TEMPERATURE: 97.1 F | WEIGHT: 250 LBS | BODY MASS INDEX: 37.03 KG/M2 | SYSTOLIC BLOOD PRESSURE: 132 MMHG | DIASTOLIC BLOOD PRESSURE: 76 MMHG | RESPIRATION RATE: 16 BRPM | HEART RATE: 92 BPM | HEIGHT: 69 IN

## 2022-02-16 LAB
ANION GAP SERPL CALCULATED.3IONS-SCNC: 12 MMOL/L (ref 5–15)
BASOPHILS # BLD AUTO: 0.05 10*3/MM3 (ref 0–0.2)
BASOPHILS NFR BLD AUTO: 0.4 % (ref 0–1.5)
BUN SERPL-MCNC: 23 MG/DL (ref 8–23)
BUN/CREAT SERPL: 16 (ref 7–25)
CALCIUM SPEC-SCNC: 8.7 MG/DL (ref 8.6–10.5)
CHLORIDE SERPL-SCNC: 98 MMOL/L (ref 98–107)
CO2 SERPL-SCNC: 22 MMOL/L (ref 22–29)
CREAT SERPL-MCNC: 1.44 MG/DL (ref 0.76–1.27)
DEPRECATED RDW RBC AUTO: 42.8 FL (ref 37–54)
EOSINOPHIL # BLD AUTO: 0.04 10*3/MM3 (ref 0–0.4)
EOSINOPHIL NFR BLD AUTO: 0.3 % (ref 0.3–6.2)
ERYTHROCYTE [DISTWIDTH] IN BLOOD BY AUTOMATED COUNT: 12.5 % (ref 12.3–15.4)
GFR SERPL CREATININE-BSD FRML MDRD: 49 ML/MIN/1.73
GLUCOSE BLDC GLUCOMTR-MCNC: 319 MG/DL (ref 70–130)
GLUCOSE SERPL-MCNC: 354 MG/DL (ref 65–99)
HBA1C MFR BLD: 9.1 % (ref 4.8–5.6)
HCT VFR BLD AUTO: 44.6 % (ref 37.5–51)
HGB BLD-MCNC: 15 G/DL (ref 13–17.7)
IMM GRANULOCYTES # BLD AUTO: 0.08 10*3/MM3 (ref 0–0.05)
IMM GRANULOCYTES NFR BLD AUTO: 0.6 % (ref 0–0.5)
LYMPHOCYTES # BLD AUTO: 1.84 10*3/MM3 (ref 0.7–3.1)
LYMPHOCYTES NFR BLD AUTO: 13.5 % (ref 19.6–45.3)
MCH RBC QN AUTO: 31.3 PG (ref 26.6–33)
MCHC RBC AUTO-ENTMCNC: 33.6 G/DL (ref 31.5–35.7)
MCV RBC AUTO: 92.9 FL (ref 79–97)
MONOCYTES # BLD AUTO: 1.18 10*3/MM3 (ref 0.1–0.9)
MONOCYTES NFR BLD AUTO: 8.7 % (ref 5–12)
NEUTROPHILS NFR BLD AUTO: 10.44 10*3/MM3 (ref 1.7–7)
NEUTROPHILS NFR BLD AUTO: 76.5 % (ref 42.7–76)
NRBC BLD AUTO-RTO: 0 /100 WBC (ref 0–0.2)
PLATELET # BLD AUTO: 179 10*3/MM3 (ref 140–450)
PMV BLD AUTO: 10.2 FL (ref 6–12)
POTASSIUM SERPL-SCNC: 4.5 MMOL/L (ref 3.5–5.2)
RBC # BLD AUTO: 4.8 10*6/MM3 (ref 4.14–5.8)
SODIUM SERPL-SCNC: 132 MMOL/L (ref 136–145)
WBC NRBC COR # BLD: 13.63 10*3/MM3 (ref 3.4–10.8)

## 2022-02-16 PROCEDURE — A9270 NON-COVERED ITEM OR SERVICE: HCPCS | Performed by: INTERNAL MEDICINE

## 2022-02-16 PROCEDURE — 85025 COMPLETE CBC W/AUTO DIFF WBC: CPT | Performed by: INTERNAL MEDICINE

## 2022-02-16 PROCEDURE — 83036 HEMOGLOBIN GLYCOSYLATED A1C: CPT | Performed by: INTERNAL MEDICINE

## 2022-02-16 PROCEDURE — 63710000001 INSULIN LISPRO (HUMAN) PER 5 UNITS: Performed by: INTERNAL MEDICINE

## 2022-02-16 PROCEDURE — 63710000001 ASPIRIN 81 MG TABLET DELAYED-RELEASE: Performed by: INTERNAL MEDICINE

## 2022-02-16 PROCEDURE — 63710000001 CILOSTAZOL 100 MG TABLET: Performed by: INTERNAL MEDICINE

## 2022-02-16 PROCEDURE — 63710000001 LISINOPRIL 10 MG TABLET: Performed by: INTERNAL MEDICINE

## 2022-02-16 PROCEDURE — 63710000001 FAMOTIDINE 20 MG TABLET: Performed by: INTERNAL MEDICINE

## 2022-02-16 PROCEDURE — 63710000001 CARVEDILOL 3.125 MG TABLET: Performed by: INTERNAL MEDICINE

## 2022-02-16 PROCEDURE — G0378 HOSPITAL OBSERVATION PER HR: HCPCS

## 2022-02-16 PROCEDURE — 80048 BASIC METABOLIC PNL TOTAL CA: CPT | Performed by: INTERNAL MEDICINE

## 2022-02-16 PROCEDURE — 63710000001 SENNOSIDES-DOCUSATE 8.6-50 MG TABLET: Performed by: INTERNAL MEDICINE

## 2022-02-16 PROCEDURE — 82962 GLUCOSE BLOOD TEST: CPT

## 2022-02-16 RX ORDER — HYDROCODONE BITARTRATE AND ACETAMINOPHEN 7.5; 325 MG/1; MG/1
1 TABLET ORAL EVERY 6 HOURS PRN
Qty: 10 TABLET | Refills: 0 | Status: SHIPPED | OUTPATIENT
Start: 2022-02-16 | End: 2022-02-22

## 2022-02-16 RX ORDER — CIPROFLOXACIN 500 MG/1
250 TABLET, FILM COATED ORAL 2 TIMES DAILY
Qty: 12 TABLET | Refills: 0 | Status: SHIPPED | OUTPATIENT
Start: 2022-02-16

## 2022-02-16 RX ORDER — FAMOTIDINE 40 MG/1
40 TABLET, FILM COATED ORAL DAILY
Qty: 30 TABLET | Refills: 3 | Status: SHIPPED | OUTPATIENT
Start: 2022-02-17

## 2022-02-16 RX ADMIN — CILOSTAZOL 100 MG: 100 TABLET ORAL at 08:18

## 2022-02-16 RX ADMIN — INSULIN LISPRO 7 UNITS: 100 INJECTION, SOLUTION INTRAVENOUS; SUBCUTANEOUS at 08:17

## 2022-02-16 RX ADMIN — ASPIRIN 81 MG: 81 TABLET, COATED ORAL at 08:18

## 2022-02-16 RX ADMIN — FAMOTIDINE 40 MG: 20 TABLET, FILM COATED ORAL at 08:18

## 2022-02-16 RX ADMIN — DOCUSATE SODIUM 50MG AND SENNOSIDES 8.6MG 2 TABLET: 8.6; 5 TABLET, FILM COATED ORAL at 08:17

## 2022-02-16 RX ADMIN — LISINOPRIL 10 MG: 10 TABLET ORAL at 08:18

## 2022-02-16 RX ADMIN — CARVEDILOL 3.12 MG: 3.12 TABLET, FILM COATED ORAL at 08:18

## 2022-02-16 NOTE — H&P
Patient Name:  Justice Aldridge  YOB: 1955  MRN:  [id number]  Admit Date:  2/15/2022  Patient Care Team:  Aretha Hdez APRN as PCP - General (Nurse Practitioner)        Chief Complaint   Patient presents with   • Abdominal Pain   • Testicle Pain   Duration 3 days.    History Present Illness     Very pleasant 66 years old white gentleman with a past history of nephrolithiasis/peripheral vascular disease status post right endarterectomy and lower extremity peripheral vascular disease/stage IIIa chronic renal failure/type 2 diabetes/hypertension/coronary artery disease/dyslipidemia.  Patient presents to the hospital with 3 days history of lower abdominal pain associated with by lateral testicular pain.  There was no back pain or groin pain.  No dysuria or hematuria but positive frequency and urgency which resolved.  He does complain of constipation with occasional fresh bright blood per rectum at the end of the bowel movement.  No nausea or vomiting.  No dysphagia or odynophagia.  No melena.  There was no history of fever or chills.  He presented to the emergency department and further work-up included CBC that was normal except a white count of 16.7.  Lipase was normal.  Basic metabolic profile was normal except a creatinine of 1.39 blood sugar of 261 and a GFR of 55.  Covid was negative.  UA was positive for blood and sugar.  CT scan of the abdomen pelvis without contrast revealed an 8 mm left stone in the proximal right ureter associated with left hydroureteral nephrosis and stranding and old distal esophageal thickening.  Testicular ultrasound revealed right epididymitis.  Patient was subsequently admitted after obtaining urology consultation.          Review of Systems   Cardiovascular/respiratory.  No chest pain.  No shortness of breath.  No cough.  No wheeze.  No hemoptysis.  No palpitation.  No ankle edema.  CNS.  No dizziness.  No loss of consciousness.  No focal neurological  symptoms.  GI.  As above.  .  As above.  Personal History     Past Medical History:   Diagnosis Date   • Carotid artery stenosis    • Diabetes mellitus (HCC)    • Hyperlipidemia    • Hypertension    • Peripheral vascular disease (HCC)    • Psoriasis     ELBOWS MIKE     Past Surgical History:   Procedure Laterality Date   • FINGER SURGERY Left    • KNEE ARTHROSCOPY     • KNEE SURGERY Left    • MI THROMBOENDARTECTMY NECK,NECK INCIS Right 11/16/2016    Procedure: RIGHT CAROTID ENDARTERECTOMY;  Surgeon: Roger Michael MD;  Location: [facility];  Service: Vascular     Family History   Problem Relation Age of Onset   • Valvular heart disease Mother    • Heart disease Father    • Cancer Paternal Grandfather      Social History     Tobacco Use   • Smoking status: Current Every Day Smoker     Packs/day: 1.00     Years: 30.00     Pack years: 30.00     Types: Cigarettes   • Smokeless tobacco: Never Used   Substance Use Topics   • Alcohol use: No     Comment: OCC   • Drug use: No     No current facility-administered medications on file prior to encounter.     Current Outpatient Medications on File Prior to Encounter   Medication Sig Dispense Refill   • aspirin 81 MG tablet Take 81 mg by mouth Daily.     • carvedilol (COREG) 3.125 MG tablet TAKE 1 TABLET BY MOUTH TWICE A DAY 60 tablet 0   • cilostazol (PLETAL) 100 MG tablet Take 100 mg by mouth 2 (two) times a day.     • lisinopril (PRINIVIL,ZESTRIL) 10 MG tablet Take 10 mg by mouth daily.     • metFORMIN (GLUCOPHAGE) 500 MG tablet Take 500 mg by mouth. 2 TABLETS EVERY MORNING,  1 TABLET AT LUNCH, 2 TABLETS WITH SUPPER     • pravastatin (PRAVACHOL) 40 MG tablet Take 40 mg by mouth daily.     • Albiglutide (TANZEUM) 50 MG pen-injector Inject 50 mg under the skin 1 (One) Time Per Week.     • glipiZIDE (GLUCOTROL) 10 MG tablet Take 10 mg by mouth 2 (two) times a day before meals.     • pioglitazone (ACTOS) 30 MG tablet Take 30 mg by mouth Daily With Lunch.       Allergies    Allergen Reactions   • Erythromycin Hives       Objective    Objective     Vital Signs  Temp:  [97.6 °F (36.4 °C)-98.7 °F (37.1 °C)] 97.6 °F (36.4 °C)  Heart Rate:  [70-93] 76  Resp:  [12-16] 16  BP: (108-174)/() 121/75  SpO2:  [92 %-98 %] 93 %  on  Flow (L/min):  [2-4] 2;   Device (Oxygen Therapy): room air  Body mass index is 36.92 kg/m².    Physical Exam  General.  Middle-aged gentleman.  Obese.  Alert and oriented x3.  No apparent pain distress or diaphoresis.  Normal mood and affect.  Eyes.  Pupils equal round and reactive.  Intact extraocular musculature.  No pallor or jaundice.  Normal conjunctive and lids.  Oral cavity.  Moist mucous membranes with no lesions.    Neck.  A right carotid scar.  The neck is supple.  No lymphadenopathy or thyromegaly.  No carotid bruit.  Cardiovascular.  Regular rate and rhythm.  No gallops or murmurs.  Chest.  Clear to auscultation bilaterally with no added sounds.  Abdomen.  Obese.  Soft lax.  No tenderness.  No organomegaly.  No guarding or rebound.  .  No CVA tenderness.  No testicular masses or tenderness.  Intact hernial orifices.  Blood tinged underwear.  Extremities.  No clubbing cyanosis or edema.  CNS.  No acute focal neurological deficits.      Results Review:  I reviewed the patient's new clinical results.  I reviewed the patient's new imaging results and agree with the interpretation.  I reviewed the patient's other test results and agree with the interpretation  I personally viewed and interpreted the patient's EKG/Telemetry data  Discussed with ED provider.    Lab Results (last 24 hours)     Procedure Component Value Units Date/Time    CBC & Differential [[id number]]  (Abnormal) Collected: 02/15/22 0514    Specimen: Blood Updated: 02/15/22 0521    Narrative:      The following orders were created for panel order CBC & Differential.  Procedure                               Abnormality         Status                     ---------                                -----------         ------                     CBC Auto Differential[[id number]]        Abnormal            Final result                 Please view results for these tests on the individual orders.    Comprehensive Metabolic Panel [[id number]]  (Abnormal) Collected: 02/15/22 0514    Specimen: Blood Updated: 02/15/22 0543     Glucose 268 mg/dL      BUN 20 mg/dL      Creatinine 1.39 mg/dL      Sodium 133 mmol/L      Potassium 4.2 mmol/L      Chloride 99 mmol/L      CO2 21.0 mmol/L      Calcium 9.6 mg/dL      Total Protein 7.5 g/dL      Albumin 4.10 g/dL      ALT (SGPT) 26 U/L      AST (SGOT) 25 U/L      Alkaline Phosphatase 132 U/L      Total Bilirubin 0.5 mg/dL      eGFR Non African Amer 51 mL/min/1.73      Globulin 3.4 gm/dL      A/G Ratio 1.2 g/dL      BUN/Creatinine Ratio 14.4     Anion Gap 13.0 mmol/L     Narrative:      GFR Normal >60  Chronic Kidney Disease <60  Kidney Failure <15      Lipase [[id number]]  (Normal) Collected: 02/15/22 0514    Specimen: Blood Updated: 02/15/22 0543     Lipase 33 U/L     CBC Auto Differential [[id number]]  (Abnormal) Collected: 02/15/22 0514    Specimen: Blood Updated: 02/15/22 0521     WBC 16.78 10*3/mm3      RBC 5.41 10*6/mm3      Hemoglobin 17.3 g/dL      Hematocrit 49.0 %      MCV 90.6 fL      MCH 32.0 pg      MCHC 35.3 g/dL      RDW 12.5 %      RDW-SD 40.3 fl      MPV 9.7 fL      Platelets 191 10*3/mm3      Neutrophil % 74.2 %      Lymphocyte % 12.6 %      Monocyte % 10.1 %      Eosinophil % 2.3 %      Basophil % 0.4 %      Immature Grans % 0.4 %      Neutrophils, Absolute 12.45 10*3/mm3      Lymphocytes, Absolute 2.12 10*3/mm3      Monocytes, Absolute 1.69 10*3/mm3      Eosinophils, Absolute 0.39 10*3/mm3      Basophils, Absolute 0.06 10*3/mm3      Immature Grans, Absolute 0.07 10*3/mm3      nRBC 0.0 /100 WBC     Urinalysis With Microscopic If Indicated (No Culture) - Urine, Clean Catch [[id number]]  (Abnormal) Collected: 02/15/22 0524    Specimen: Urine, Clean Catch Updated:  02/15/22 0538     Color, UA Yellow     Appearance, UA Clear     pH, UA <=5.0     Specific Gravity, UA >=1.030     Glucose, UA >=1000 mg/dL (3+)     Ketones, UA 15 mg/dL (1+)     Bilirubin, UA Negative     Blood, UA Moderate (2+)     Protein, UA Trace     Leuk Esterase, UA Negative     Nitrite, UA Negative     Urobilinogen, UA 0.2 E.U./dL    Urinalysis, Microscopic Only - Urine, Clean Catch [[id number]]  (Abnormal) Collected: 02/15/22 0524    Specimen: Urine, Clean Catch Updated: 02/15/22 0538     RBC, UA 6-12 /HPF      WBC, UA 0-2 /HPF      Bacteria, UA None Seen /HPF      Squamous Epithelial Cells, UA 0-2 /HPF      Hyaline Casts, UA None Seen /LPF      Methodology Automated Microscopy    COVID PRE-OP / PRE-PROCEDURE SCREENING ORDER (NO ISOLATION) - Swab, Nasopharynx [[id number]]  (Normal) Collected: 02/15/22 0841    Specimen: Swab from Nasopharynx Updated: 02/15/22 0912    Narrative:      The following orders were created for panel order COVID PRE-OP / PRE-PROCEDURE SCREENING ORDER (NO ISOLATION) - Swab, Nasopharynx.  Procedure                               Abnormality         Status                     ---------                               -----------         ------                     COVID-19,[facility] IN-HOUSE...[[id number]]  Normal              Final result                 Please view results for these tests on the individual orders.    COVID-19,[facility] IN-HOUSE CEPHEID/JOSÉ MIGUEL NP SWAB IN TRANSPORT MEDIA 8-12 HR TAT - Swab, Nasopharynx [[id number]]  (Normal) Collected: 02/15/22 0841    Specimen: Swab from Nasopharynx Updated: 02/15/22 0912     COVID19 Not Detected    Narrative:      Fact sheet for providers: https://www.fda.gov/media/352163/download    Fact sheet for patients: https://www.fda.gov/media/907639/download    Test performed by PCR.    POC Glucose Once [[id number]]  (Abnormal) Collected: 02/15/22 0859    Specimen: Blood Updated: 02/15/22 0901     Glucose 197 mg/dL      Comment: Meter: [id number] :  [id number] Ki[place] Karlie RN       POC Glucose Once [[id number]]  (Abnormal) Collected: 02/15/22 1124    Specimen: Blood Updated: 02/15/22 1126     Glucose 189 mg/dL      Comment: Meter: [id number] : [id number] Diaz Gabribethany RN       POC Glucose Once [[id number]]  (Abnormal) Collected: 02/15/22 1346    Specimen: Blood Updated: 02/15/22 1346     Glucose 184 mg/dL      Comment: Meter: [id number] : [id number] Jose L Smith RN             Imaging Results (Last 24 Hours)     Procedure Component Value Units Date/Time    FL Retrograde Pyelogram In OR [[id number]] Collected: 02/15/22 1612     Updated: 02/15/22 1718    Narrative:      RETROGRADE PYELOGRAM     HISTORY: Left ureteral stone and obstruction.     Imaging in the operating room demonstrates instrumentation of the left  ureter followed by insertion of a double-J ureteral stent. Please also  see the operative report.     Five images were obtained and the fluoroscopy time measures 24 seconds.     This report was finalized on 2/15/2022 5:15 PM by Dr. Mani Roman M.D.       US Testicular or Ovarian Vascular Limited [[id number]] Collected: 02/15/22 1113     Updated: 02/15/22 1113    Narrative:        Patient: MARIA C ROSSI  Time Out: 06:57  Exam(s): US SCROTAL, US OTHER     EXAM:    US Scrotum    CLINICAL HISTORY:     Reason for exam: Testicular Scrotal Pain. Testicular Scrotal Pain.    TECHNIQUE:    Real-time ultrasound of the scrotum with color Doppler and image   documentation.    COMPARISON:    No relevant prior studies available.    FINDINGS:    Right testicle: Measures 4.5 x 2.2 x 2.6 cm.  No mass.  No torsion.    Small right hydrocele.    Left testicle: Measures 4.6 x 3.0 x 1.8 cm.  No mass.  No torsion.    Left-sided testicular cyst measuring up to 1.2 cm    Epididymides: Left-sided epididymal head cyst measuring up to 1.5 cm.    2 right-sided epididymal head cyst measuring up to 8 mm.  Mild hyperemia   of the right epididymal tail.    Scrotum:   Unremarkable.    IMPRESSION:       Mild hyperemia of the right epididymal tail which may be due to   epididymitis.  No evidence of torsion bilaterally.      Impression:          Electronically signed by Lv Martinez M.D. on 02-15-22 at 0657    CT Abdomen Pelvis Without Contrast [[id number]] Collected: 02/15/22 0707     Updated: 02/15/22 0725    Narrative:      CT ABDOMEN AND PELVIS WITHOUT IV CONTRAST     HISTORY: Lower abdominal/testicular pain     TECHNIQUE: Radiation dose reduction techniques were utilized, including  automated exposure control and exposure modulation based on body size.   3 mm images were obtained through the abdomen and pelvis without IV  contrast.      COMPARISON: CT abdomen and pelvis 01/14/2017     FINDINGS:  The distal esophagus has a thickened appearance, similar to that seen  2017. There are no findings of small bowel obstruction.     No abdominopelvic adenopathy by size criteria is present. The appendix  has a mildly prominent appearance without significant periappendiceal  fat stranding, likely physiologic. The bladder is decompressed and  cannot be evaluated. No free intraperitoneal air is seen. Subcentimeter  hepatic lesions are too small to characterize.     The gallbladder, pancreas, spleen and adrenal glands have an  unremarkable noncontrast CT appearance.     There is a 8 x 8 mm calculus within the left proximal ureter with  moderate upstream hydroureteronephrosis. Significant asymmetric fat  stranding and soft tissue thickening surrounds the left ureter and  kidney. Large calculus within a left mid pole renal calyx measures 9 mm.     No suspicious lytic blastic osseous lesions present.       Impression:      1.  8 x 8 mm calculus within the left proximal ureter with moderate  upstream hydroureteronephrosis. Asymmetric stranding surrounding the  left kidney and ureter represents sequela of obstructive uropathy and/or  coexistent infection in the appropriate clinical context and  correlation  with patient history and urinalysis is recommended.  2.  Thickening of distal esophagus, similar to that seen 2017,  suggestive of esophagitis in the appropriate context correlation patient  history is recommended with follow-up endoscopy if clinically indicated.  3.  Other findings as above     This report was finalized on 2/15/2022 7:22 AM by Dr. Jose M Daniels M.D.       US Scrotum & Testicles [[id number]] Collected: 02/15/22 0658     Updated: 02/15/22 0658    Narrative:        Patient: MARIA C ROSSI  Time Out: 06:57  Exam(s): US SCROTAL, US OTHER     EXAM:    US Scrotum    CLINICAL HISTORY:     Reason for exam: Testicular Scrotal Pain. Testicular Scrotal Pain.    TECHNIQUE:    Real-time ultrasound of the scrotum with color Doppler and image   documentation.    COMPARISON:    No relevant prior studies available.    FINDINGS:    Right testicle: Measures 4.5 x 2.2 x 2.6 cm.  No mass.  No torsion.    Small right hydrocele.    Left testicle: Measures 4.6 x 3.0 x 1.8 cm.  No mass.  No torsion.    Left-sided testicular cyst measuring up to 1.2 cm    Epididymides: Left-sided epididymal head cyst measuring up to 1.5 cm.    2 right-sided epididymal head cyst measuring up to 8 mm.  Mild hyperemia   of the right epididymal tail.    Scrotum:  Unremarkable.    IMPRESSION:       Mild hyperemia of the right epididymal tail which may be due to   epididymitis.  No evidence of torsion bilaterally.      Impression:          Electronically signed by Lv Martinez M.D. on 02-15-22 at 0657              No orders to display            Assessment/Plan     Active Hospital Problems    Diagnosis  POA   • **Left ureteral calculus [N20.1]  Yes   • Right epididymitis [N45.1]  Yes   • Hydroureteronephrosis [N13.30]  Yes   • Chronic kidney failure, stage 3 (moderate) (HCC) [N18.30]  Yes   • Essential hypertension [I10]  Yes   • Peripheral vascular disease (HCC) [I73.9]  Yes   • Type 2 diabetes mellitus (HCC) [E11.9]  Yes   •  Coronary artery disease [I25.10]  Yes   • Hyperlipidemia [E78.5]  Yes      Resolved Hospital Problems   No resolved problems to display.           · Left proximal obstructing ureteral stone with left hydroureteral nephrosis/right epididymitis.  Patient is status post cystoscopy and left stent placement.  Urology planning on discharging home tomorrow with an outpatient follow-up for lithotripsy.  Will initiate IV Rocephin for epididymitis.  The UA without UTI.  We will continue pain control with Percocet and IV morphine.  · Stage IIIa chronic renal failure.  Patient appears to be euvolemic care.  Renal function about the baseline.  Will monitor.  · Hypertension/coronary artery disease.  Asymptomatic.  No evidence of angina or congestive heart failure.  We will continue aspirin/Coreg/lisinopril.  · Hyperlipidemia/peripheral vascular disease.  We will continue aspirin/Pletal/Pravachol.  No complications.  · Type 2 diabetes.  Will check A1c.  Will hold all oral hypoglycemics and Tanzeum.  Will place on sliding scale insulin.  And placed on hypoglycemia protocol.  · Thickening of the distal esophagus suggestive of esophagitis.  Patient is asymptomatic.  Will place on Pepcid and will need GI follow-up as an outpatient for EGD.  · VTE prophylaxis with sequential compression devices.      Discussed my findings and plan of treatment with the patient.      Code Status -full code.       Frances Pulido MD  [facility]  02/15/22  21:02 EST

## 2022-02-16 NOTE — PLAN OF CARE
Goal Outcome Evaluation:           Progress: improving   Patient received a left kidney stent yesterday. He is alert and oriented times four Up as tolerated. Ns infusing at 100 cc/hr. He has been ambulating to the restroom. All urine is being strained. No complaints of pain or discomfort at this time. Plans for discharge home today. Will monitor.

## 2022-02-16 NOTE — DISCHARGE SUMMARY
Patient Name: Justice Aldridge  : 1955  MRN: [id number]    Date of Admission: 2/15/2022  Date of Discharge:  2022  Primary Care Physician: Aretha Hdez, JESSENIA      Discharge Diagnoses     Active Hospital Problems    Diagnosis  POA   • **Left ureteral calculus [N20.1]  Yes   • Right epididymitis [N45.1]  Yes   • Hydroureteronephrosis [N13.30]  Yes   • Chronic kidney failure, stage 3 (moderate) (HCC) [N18.30]  Yes   • Essential hypertension [I10]  Yes   • Peripheral vascular disease (HCC) [I73.9]  Yes   • Type 2 diabetes mellitus (HCC) [E11.9]  Yes   • GERD with esophagitis [K21.00]  Yes   • Coronary artery disease [I25.10]  Yes   • Hyperlipidemia [E78.5]  Yes      Resolved Hospital Problems   No resolved problems to display.        Hospital Course     Brief admission history and physical.  Please refer to the H&P for full details.  A pleasant 66 years old white gentleman with a past history of nephrolithiasis/peripheral vascular disease status post right embolectomy/stage III chronic renal failure/type 2 diabetes/hypertension/coronary artery disease/dyslipidemia who presents to the hospital with 3 days history of lower abdominal pain and bilateral testicular pain.  Positive frequency and urgency.  Positive constipation.  No other symptomatology.  His physical examination on admission included a temperature of 97.6 a pulse of 76 respiratory rate of 16 and blood pressure 121/75 and O2 sats of 93% on room air.  Rest of the examination is remarkable for obesity/right carotid scar.  Hospital course.  Initial ER evaluation included.  CBC that was normal except a white count of 16.7.  Lipase was normal.  Basic metabolic profile was normal except a creatinine of 1.39 and a random blood sugar of 261 with a GFR of 55.  Covid was negative.  UA was positive for blood and sugar.  CT scan of the abdomen pelvis without contrast revealed an obstructing 8 mm left ureteric stone with hydronephrosis and  stranding and old esophageal thickening.  Testicular ultrasound revealed right epididymitis and the patient was subsequently admitted.  The diagnosis was that of left proximal obstructing ureteral stone with left hydroureter and hydronephrosis with right epididymitis.  Urology was consulted patient underwent cystoscopy and left stent placement.  Urology agreed with the discharge with an outpatient follow-up for lithotripsy.  He was started on IV Rocephin for the epididymitis and possible Brandan nephritis.  His UA was negative for infection.  He was initiated on IV morphine and Percocet for pain control.  He was started on IV fluid.  The patient symptomatology has greatly improved with resolution of the abdominal pain.  He did have hematuria after the procedure that has resolved by the time of discharge.  He was tolerating a diet well.  There was no fever.  He was switched to p.o. Percocet and a renal dose p.o. Cipro for a total of 7 days at the time of discharge.  He is to follow-up with the urologist for lithotripsy.  Has a stage IIIa chronic renal failure.  He appears to be euvolemic.  Renal function remains about the baseline.  We will need to avoid any nephrotoxic agents.  Has a history of hypertension and coronary artery disease.  There was no clinical evidence of angina or congestive heart failure.  We will continue with his aspirin/Coreg/lisinopril and he remained with good blood pressure control.  Has a history of hyperlipidemia/peripheral vascular disease and will continue the aspirin/Pletal/Pravachol.  There was no complications.  Has a type 2 diabetes his A1c was elevated at 9.1.  During the hospital stay his oral hypoglycemic and Tanzeum has been held and he was placed on sliding scale insulin and hypoglycemia protocol.  His blood sugar was noted to be elevated and he stated to me prior to discharge that he was on insulin at home but this was never reconciled in the hospital medication list.  I instructed  the patient to continue his home regimen of insulin and to continue to follow-up with his endocrinologist.  Incidental finding of a distal SFA the leg thickening suggestive of esophagitis was noted by CAT scan.  He was placed on Pepcid.  I gave him a follow-up GI appointment for possible EGD for further evaluation.  At the time of discharge his white count has decreased to 13.6.  He is to follow-up with his primary care physician/urology in 1 week GI in 4 weeks.  He was counseled to increase water intake and stay away from nonsteroidal anti-inflammatory medications.  He was hemodynamically stable at the time of discharge    Consultants     Consult Orders (all) (From admission, onward)     Start     Ordered    02/15/22 0815  [facility] (on-call MD unless specified) Details  Once        Specialty:  Hospitalist  Provider:  (Not yet assigned)    02/15/22 0814    02/15/22 0748  Urology (on-call MD unless specified)  Once        Specialty:  Urology  Provider:  (Not yet assigned)    02/15/22 0747              Procedures     Imaging Results (All)     Procedure Component Value Units Date/Time    FL Retrograde Pyelogram In OR [[id number]] Collected: 02/15/22 1612     Updated: 02/15/22 1718    Narrative:      RETROGRADE PYELOGRAM     HISTORY: Left ureteral stone and obstruction.     Imaging in the operating room demonstrates instrumentation of the left  ureter followed by insertion of a double-J ureteral stent. Please also  see the operative report.     Five images were obtained and the fluoroscopy time measures 24 seconds.     This report was finalized on 2/15/2022 5:15 PM by Dr. Mani Roman M.D.       US Testicular or Ovarian Vascular Limited [[id number]] Collected: 02/15/22 1113     Updated: 02/15/22 1113    Narrative:        Patient: MARIA C ROSSI  Time Out: 06:57  Exam(s): US SCROTAL, US OTHER     EXAM:    US Scrotum    CLINICAL HISTORY:     Reason for exam: Testicular Scrotal Pain. Testicular Scrotal Pain.    TECHNIQUE:     Real-time ultrasound of the scrotum with color Doppler and image   documentation.    COMPARISON:    No relevant prior studies available.    FINDINGS:    Right testicle: Measures 4.5 x 2.2 x 2.6 cm.  No mass.  No torsion.    Small right hydrocele.    Left testicle: Measures 4.6 x 3.0 x 1.8 cm.  No mass.  No torsion.    Left-sided testicular cyst measuring up to 1.2 cm    Epididymides: Left-sided epididymal head cyst measuring up to 1.5 cm.    2 right-sided epididymal head cyst measuring up to 8 mm.  Mild hyperemia   of the right epididymal tail.    Scrotum:  Unremarkable.    IMPRESSION:       Mild hyperemia of the right epididymal tail which may be due to   epididymitis.  No evidence of torsion bilaterally.      Impression:          Electronically signed by Lv Martinez M.D. on 02-15-22 at 0657    CT Abdomen Pelvis Without Contrast [[id number]] Collected: 02/15/22 0707     Updated: 02/15/22 0725    Narrative:      CT ABDOMEN AND PELVIS WITHOUT IV CONTRAST     HISTORY: Lower abdominal/testicular pain     TECHNIQUE: Radiation dose reduction techniques were utilized, including  automated exposure control and exposure modulation based on body size.   3 mm images were obtained through the abdomen and pelvis without IV  contrast.      COMPARISON: CT abdomen and pelvis 01/14/2017     FINDINGS:  The distal esophagus has a thickened appearance, similar to that seen  2017. There are no findings of small bowel obstruction.     No abdominopelvic adenopathy by size criteria is present. The appendix  has a mildly prominent appearance without significant periappendiceal  fat stranding, likely physiologic. The bladder is decompressed and  cannot be evaluated. No free intraperitoneal air is seen. Subcentimeter  hepatic lesions are too small to characterize.     The gallbladder, pancreas, spleen and adrenal glands have an  unremarkable noncontrast CT appearance.     There is a 8 x 8 mm calculus within the left proximal ureter  with  moderate upstream hydroureteronephrosis. Significant asymmetric fat  stranding and soft tissue thickening surrounds the left ureter and  kidney. Large calculus within a left mid pole renal calyx measures 9 mm.     No suspicious lytic blastic osseous lesions present.       Impression:      1.  8 x 8 mm calculus within the left proximal ureter with moderate  upstream hydroureteronephrosis. Asymmetric stranding surrounding the  left kidney and ureter represents sequela of obstructive uropathy and/or  coexistent infection in the appropriate clinical context and correlation  with patient history and urinalysis is recommended.  2.  Thickening of distal esophagus, similar to that seen 2017,  suggestive of esophagitis in the appropriate context correlation patient  history is recommended with follow-up endoscopy if clinically indicated.  3.  Other findings as above     This report was finalized on 2/15/2022 7:22 AM by Dr. Jose M Daniels M.D.       US Scrotum & Testicles [[id number]] Collected: 02/15/22 0658     Updated: 02/15/22 0658    Narrative:        Patient: MARIA C ROSSI  Time Out: 06:57  Exam(s): US SCROTAL, US OTHER     EXAM:    US Scrotum    CLINICAL HISTORY:     Reason for exam: Testicular Scrotal Pain. Testicular Scrotal Pain.    TECHNIQUE:    Real-time ultrasound of the scrotum with color Doppler and image   documentation.    COMPARISON:    No relevant prior studies available.    FINDINGS:    Right testicle: Measures 4.5 x 2.2 x 2.6 cm.  No mass.  No torsion.    Small right hydrocele.    Left testicle: Measures 4.6 x 3.0 x 1.8 cm.  No mass.  No torsion.    Left-sided testicular cyst measuring up to 1.2 cm    Epididymides: Left-sided epididymal head cyst measuring up to 1.5 cm.    2 right-sided epididymal head cyst measuring up to 8 mm.  Mild hyperemia   of the right epididymal tail.    Scrotum:  Unremarkable.    IMPRESSION:       Mild hyperemia of the right epididymal tail which may be due to    epididymitis.  No evidence of torsion bilaterally.      Impression:          Electronically signed by Lv Martinez M.D. on 02-15-22 at 0657          Pertinent Labs     Results from last 7 days   Lab Units 02/16/22  0707 02/15/22  0514   WBC 10*3/mm3 13.63* 16.78*   HEMOGLOBIN g/dL 15.0 17.3   PLATELETS 10*3/mm3 179 191     Results from last 7 days   Lab Units 02/16/22  0707 02/15/22  0514   SODIUM mmol/L 132* 133*   POTASSIUM mmol/L 4.5 4.2   CHLORIDE mmol/L 98 99   CO2 mmol/L 22.0 21.0*   BUN mg/dL 23 20   CREATININE mg/dL 1.44* 1.39*   GLUCOSE mg/dL 354* 268*   Estimated Creatinine Clearance: 63.4 mL/min (A) (by C-G formula based on SCr of 1.44 mg/dL (H)).  Results from last 7 days   Lab Units 02/15/22  0514   ALBUMIN g/dL 4.10   BILIRUBIN mg/dL 0.5   ALK PHOS U/L 132*   AST (SGOT) U/L 25   ALT (SGPT) U/L 26     Results from last 7 days   Lab Units 02/16/22  0707 02/15/22  0514   CALCIUM mg/dL 8.7 9.6   ALBUMIN g/dL  --  4.10     Results from last 7 days   Lab Units 02/15/22  0514   LIPASE U/L 33             Invalid input(s): LDLCALC      Imaging Results (Last 24 Hours)     Procedure Component Value Units Date/Time    FL Retrograde Pyelogram In OR [[id number]] Collected: 02/15/22 1612     Updated: 02/15/22 1718    Narrative:      RETROGRADE PYELOGRAM     HISTORY: Left ureteral stone and obstruction.     Imaging in the operating room demonstrates instrumentation of the left  ureter followed by insertion of a double-J ureteral stent. Please also  see the operative report.     Five images were obtained and the fluoroscopy time measures 24 seconds.     This report was finalized on 2/15/2022 5:15 PM by Dr. Mani Roman M.D.        Testicular or Ovarian Vascular Limited [[id number]] Collected: 02/15/22 1113     Updated: 02/15/22 1113    Narrative:        Patient: MARIA C ROSSI  Time Out: 06:57  Exam(s): US SCROTAL, US OTHER     EXAM:    US Scrotum    CLINICAL HISTORY:     Reason for exam: Testicular Scrotal  Pain. Testicular Scrotal Pain.    TECHNIQUE:    Real-time ultrasound of the scrotum with color Doppler and image   documentation.    COMPARISON:    No relevant prior studies available.    FINDINGS:    Right testicle: Measures 4.5 x 2.2 x 2.6 cm.  No mass.  No torsion.    Small right hydrocele.    Left testicle: Measures 4.6 x 3.0 x 1.8 cm.  No mass.  No torsion.    Left-sided testicular cyst measuring up to 1.2 cm    Epididymides: Left-sided epididymal head cyst measuring up to 1.5 cm.    2 right-sided epididymal head cyst measuring up to 8 mm.  Mild hyperemia   of the right epididymal tail.    Scrotum:  Unremarkable.    IMPRESSION:       Mild hyperemia of the right epididymal tail which may be due to   epididymitis.  No evidence of torsion bilaterally.      Impression:          Electronically signed by Lv Martinez M.D. on 02-15-22 at 0657          Test Results Pending at Discharge         Discharge Exam   Physical Exam  Vitals temperature 97.1 a pulse of 94 respiratory rate of 16 blood pressure 132/76 and O2 sats of 95% on room air  General.  Middle-aged gentleman.  Obese.  Alert and oriented x3.  No apparent pain distress or diaphoresis.  Normal mood and affect.  Obese  Eyes.  Pupils equal round and reactive.  Intact extraocular musculature.  No pallor or jaundice.  Normal conjunctive and lids.  Oral cavity.  Moist mucous membranes with no lesions.    Neck.  A right carotid scar.  The neck is supple.  No lymphadenopathy or thyromegaly.  No carotid bruit.  Cardiovascular.  Regular rate and rhythm.  No gallops or murmurs.  Chest.  Clear to auscultation bilaterally with no added sounds.  Abdomen.  Obese.  Soft lax.  No tenderness.  No organomegaly.  No guarding or rebound.  .  No CVA tenderness.  No testicular masses or tenderness.  Intact hernial orifices.    Extremities.  No clubbing cyanosis or edema.  CNS.  No acute focal neurological deficits.  Discharge Details        Discharge Medications      New  Medications      Instructions Start Date   ciprofloxacin 500 MG tablet  Commonly known as: CIPRO   250 mg, Oral, 2 Times Daily      famotidine 40 MG tablet  Commonly known as: PEPCID   40 mg, Oral, Daily   Start Date: February 17, 2022     HYDROcodone-acetaminophen 7.5-325 MG per tablet  Commonly known as: NORCO   1 tablet, Oral, Every 6 Hours PRN         Continue These Medications      Instructions Start Date   aspirin 81 MG tablet   81 mg, Oral, Daily      carvedilol 3.125 MG tablet  Commonly known as: COREG   TAKE 1 TABLET BY MOUTH TWICE A DAY      cilostazol 100 MG tablet  Commonly known as: PLETAL   100 mg, Oral, 2 Times Daily      glipizide 10 MG tablet  Commonly known as: GLUCOTROL   10 mg, Oral, 2 Times Daily Before Meals      lisinopril 10 MG tablet  Commonly known as: PRINIVIL,ZESTRIL   10 mg, Oral, Daily      metFORMIN 500 MG tablet  Commonly known as: GLUCOPHAGE   500 mg, Oral, 2 TABLETS EVERY MORNING,  1 TABLET AT LUNCH, 2 TABLETS WITH SUPPER      pioglitazone 30 MG tablet  Commonly known as: ACTOS   30 mg, Oral, Daily With Lunch      pravastatin 40 MG tablet  Commonly known as: PRAVACHOL   40 mg, Oral, Daily      Tanzeum 50 MG pen-injector  Generic drug: Albiglutide   50 mg, Subcutaneous, Weekly             Allergies   Allergen Reactions   • Erythromycin Hives         Discharge Disposition:  Condition: Stable    Diet:   Diet Order   Procedures   • Diet Regular   -Cardiac/diabetic/renal    Activity:   Activity Instructions     Activity as Tolerated            Counseling : Increase water intake to 8 glasses/day.  Monitor your blood sugar and your blood pressure.  Stay away from nonsteroidal anti-inflammatory medications.  Resume your insulin as you were previously taking.    CODE STATUS:    Code Status and Medical Interventions:   Ordered at: 02/15/22 2040     Level Of Support Discussed With:    Patient     Code Status (Patient has no pulse and is not breathing):    CPR (Attempt to Resuscitate)      Medical Interventions (Patient has pulse or is breathing):    Full Support       No future appointments.  Additional Instructions for the Follow-ups that You Need to Schedule     Call MD With Problems / Concerns   As directed      Instructions: Call MD or return to ER if fever chills/recurrence of abdominal pain and back pain/hematuria/dysuria/chest pain/nausea or vomiting    Order Comments: Instructions: Call MD or return to ER if fever chills/recurrence of abdominal pain and back pain/hematuria/dysuria/chest pain/nausea or vomiting          Discharge Follow-up with PCP   As directed       Currently Documented PCP:    Aretha Hdez APRN    PCP Phone Number:    [phone]     Follow Up Details: 1 week.  Obstructing left ureteric stone/epididymitis/stage III chronic renal failure/coronary artery disease/peripheral vascular disease/type 2 diabetes         Discharge Follow-up with Specified Provider: Endocrinology.  As scheduled.   As directed      To: Endocrinology.  As scheduled.    Follow Up Details: Type 2 diabetes.         Discharge Follow-up with Specified Provider: GI.; 2 Weeks   As directed      To: GI.    Follow Up: 2 Weeks    Follow Up Details: Thickening of the distal esophagus by CAT scan.         Discharge Follow-up with Specified Provider: Urology; 1 Week   As directed      To: Urology    Follow Up: 1 Week    Follow Up Details: Obstructing left ureteric stone/right epididymitis            Follow-up Information     Aretha Hdez APRN .    Specialty: Nurse Practitioner  Why: 1 week.  Obstructing left ureteric stone/epididymitis/stage III chronic renal failure/coronary artery disease/peripheral vascular disease/type 2 diabetes  Contact information:  [address]  [facility] [address]  [place] [address]  [phone]                           Time Spent on Discharge:  Greater than 30 minutes      Frances Pulido MD  [facility]  02/16/22  10:01 EST

## 2022-02-16 NOTE — PROGRESS NOTES
Case Management Discharge Note      Final Note: Discharged to home on 2/16/22. YOGESH Bernard RN CCP.         Selected Continued Care - Discharged on 2/16/2022 Admission date: 2/15/2022 - Discharge disposition: Home or Self Care    Destination    No services have been selected for the patient.              Durable Medical Equipment    No services have been selected for the patient.              Dialysis/Infusion    No services have been selected for the patient.              Home Medical Care    No services have been selected for the patient.              Therapy    No services have been selected for the patient.              Community Resources    No services have been selected for the patient.              Community & DME    No services have been selected for the patient.                  Transportation Services  Private: Car    Final Discharge Disposition Code: 01 - home or self-care

## 2022-11-10 ENCOUNTER — TRANSCRIBE ORDERS (OUTPATIENT)
Dept: ADMINISTRATIVE | Facility: HOSPITAL | Age: 67
End: 2022-11-10

## 2022-11-10 ENCOUNTER — HOSPITAL ENCOUNTER (OUTPATIENT)
Dept: CARDIOLOGY | Facility: HOSPITAL | Age: 67
Discharge: HOME | End: 2022-11-10
Admitting: INTERNAL MEDICINE

## 2022-11-10 DIAGNOSIS — I82.409 ACUTE THROMBOEMBOLISM OF DEEP VEINS OF LOWER EXTREMITY, UNSPECIFIED LATERALITY: Primary | ICD-10-CM

## 2022-11-10 DIAGNOSIS — I82.409 ACUTE THROMBOEMBOLISM OF DEEP VEINS OF LOWER EXTREMITY, UNSPECIFIED LATERALITY: ICD-10-CM

## 2022-11-10 LAB
BH CV LOWER VASCULAR LEFT COMMON FEMORAL AUGMENT: NORMAL
BH CV LOWER VASCULAR LEFT COMMON FEMORAL COMPETENT: NORMAL
BH CV LOWER VASCULAR LEFT COMMON FEMORAL COMPRESS: NORMAL
BH CV LOWER VASCULAR LEFT COMMON FEMORAL PHASIC: NORMAL
BH CV LOWER VASCULAR LEFT COMMON FEMORAL SPONT: NORMAL
BH CV LOWER VASCULAR LEFT DISTAL FEMORAL COMPRESS: NORMAL
BH CV LOWER VASCULAR LEFT GASTRONEMIUS COMPRESS: NORMAL
BH CV LOWER VASCULAR LEFT GREATER SAPH AK COMPRESS: NORMAL
BH CV LOWER VASCULAR LEFT GREATER SAPH BK COMPRESS: NORMAL
BH CV LOWER VASCULAR LEFT LESSER SAPH COMPRESS: NORMAL
BH CV LOWER VASCULAR LEFT MID FEMORAL AUGMENT: NORMAL
BH CV LOWER VASCULAR LEFT MID FEMORAL COMPETENT: NORMAL
BH CV LOWER VASCULAR LEFT MID FEMORAL COMPRESS: NORMAL
BH CV LOWER VASCULAR LEFT MID FEMORAL PHASIC: NORMAL
BH CV LOWER VASCULAR LEFT MID FEMORAL SPONT: NORMAL
BH CV LOWER VASCULAR LEFT PERONEAL COMPRESS: NORMAL
BH CV LOWER VASCULAR LEFT POPLITEAL AUGMENT: NORMAL
BH CV LOWER VASCULAR LEFT POPLITEAL COMPETENT: NORMAL
BH CV LOWER VASCULAR LEFT POPLITEAL COMPRESS: NORMAL
BH CV LOWER VASCULAR LEFT POPLITEAL PHASIC: NORMAL
BH CV LOWER VASCULAR LEFT POPLITEAL SPONT: NORMAL
BH CV LOWER VASCULAR LEFT POSTERIOR TIBIAL COMPRESS: NORMAL
BH CV LOWER VASCULAR LEFT PROFUNDA FEMORAL COMPRESS: NORMAL
BH CV LOWER VASCULAR LEFT PROXIMAL FEMORAL COMPRESS: NORMAL
BH CV LOWER VASCULAR LEFT SAPHENOFEMORAL JUNCTION COMPRESS: NORMAL
BH CV LOWER VASCULAR RIGHT COMMON FEMORAL AUGMENT: NORMAL
BH CV LOWER VASCULAR RIGHT COMMON FEMORAL COMPETENT: NORMAL
BH CV LOWER VASCULAR RIGHT COMMON FEMORAL COMPRESS: NORMAL
BH CV LOWER VASCULAR RIGHT COMMON FEMORAL PHASIC: NORMAL
BH CV LOWER VASCULAR RIGHT COMMON FEMORAL SPONT: NORMAL
MAXIMAL PREDICTED HEART RATE: 154 BPM
STRESS TARGET HR: 131 BPM

## 2022-11-10 PROCEDURE — 93971 EXTREMITY STUDY: CPT

## 2022-11-10 NOTE — PROGRESS NOTES
Today's preliminary report for left lower extremity venous Doppler is negative for DVT. Report called to Dr. Huber who said the patient can go home.

## 2024-03-13 DIAGNOSIS — I65.23 BILATERAL CAROTID ARTERY STENOSIS: ICD-10-CM

## 2024-03-13 DIAGNOSIS — I73.9 PERIPHERAL VASCULAR DISEASE, UNSPECIFIED: Primary | ICD-10-CM

## 2024-08-07 PROBLEM — I67.9 CEREBROVASCULAR DISEASE: Status: ACTIVE | Noted: 2024-08-07

## 2024-08-07 PROBLEM — N20.0 NEPHROLITHIASIS: Status: ACTIVE | Noted: 2024-08-07

## 2024-08-09 ENCOUNTER — HOSPITAL ENCOUNTER (OUTPATIENT)
Facility: HOSPITAL | Age: 69
Discharge: HOME | End: 2024-08-09
Payer: MEDICARE

## 2024-08-09 ENCOUNTER — OFFICE VISIT (OUTPATIENT)
Age: 69
End: 2024-08-09
Payer: MEDICARE

## 2024-08-09 VITALS
DIASTOLIC BLOOD PRESSURE: 78 MMHG | BODY MASS INDEX: 37.03 KG/M2 | HEIGHT: 69 IN | SYSTOLIC BLOOD PRESSURE: 138 MMHG | WEIGHT: 250 LBS

## 2024-08-09 DIAGNOSIS — I65.23 BILATERAL CAROTID ARTERY STENOSIS: ICD-10-CM

## 2024-08-09 DIAGNOSIS — I73.9 PERIPHERAL VASCULAR DISEASE: ICD-10-CM

## 2024-08-09 DIAGNOSIS — I65.23 CAROTID STENOSIS, BILATERAL: ICD-10-CM

## 2024-08-09 DIAGNOSIS — I73.9 PERIPHERAL VASCULAR DISEASE, UNSPECIFIED: ICD-10-CM

## 2024-08-09 DIAGNOSIS — E11.610 TYPE 2 DIABETES MELLITUS WITH DIABETIC NEUROPATHIC ARTHROPATHY, WITHOUT LONG-TERM CURRENT USE OF INSULIN: Chronic | ICD-10-CM

## 2024-08-09 DIAGNOSIS — I65.23 BILATERAL CAROTID ARTERY STENOSIS: Primary | ICD-10-CM

## 2024-08-09 LAB
BH CV LOWER ARTERIAL LEFT ABI RATIO: 0.54
BH CV LOWER ARTERIAL LEFT DORSALIS PEDIS SYS MAX: 70
BH CV LOWER ARTERIAL LEFT POST TIBIAL SYS MAX: 80
BH CV LOWER ARTERIAL RIGHT ABI RATIO: 0.69
BH CV LOWER ARTERIAL RIGHT DORSALIS PEDIS SYS MAX: 100
BH CV LOWER ARTERIAL RIGHT POST TIBIAL SYS MAX: 102
BH CV XLRA MEAS LEFT CAROTID BULB EDV: 26.9 CM/SEC
BH CV XLRA MEAS LEFT CAROTID BULB PSV: 80.6 CM/SEC
BH CV XLRA MEAS LEFT DIST CCA EDV: -20.8 CM/SEC
BH CV XLRA MEAS LEFT DIST CCA PSV: -95.3 CM/SEC
BH CV XLRA MEAS LEFT DIST ICA EDV: -29.5 CM/SEC
BH CV XLRA MEAS LEFT DIST ICA PSV: -104 CM/SEC
BH CV XLRA MEAS LEFT ICA/CCA RATIO: 1.09
BH CV XLRA MEAS LEFT MID CCA EDV: -19.1 CM/SEC
BH CV XLRA MEAS LEFT MID CCA PSV: -84 CM/SEC
BH CV XLRA MEAS LEFT MID ICA EDV: -31.2 CM/SEC
BH CV XLRA MEAS LEFT MID ICA PSV: -84 CM/SEC
BH CV XLRA MEAS LEFT PROX CCA EDV: 18.2 CM/SEC
BH CV XLRA MEAS LEFT PROX CCA PSV: 109.2 CM/SEC
BH CV XLRA MEAS LEFT PROX ECA EDV: -13 CM/SEC
BH CV XLRA MEAS LEFT PROX ECA PSV: -85.8 CM/SEC
BH CV XLRA MEAS LEFT PROX ICA EDV: -26.9 CM/SEC
BH CV XLRA MEAS LEFT PROX ICA PSV: -78 CM/SEC
BH CV XLRA MEAS LEFT PROX SCLA PSV: 126.5 CM/SEC
BH CV XLRA MEAS LEFT VERTEBRAL A EDV: 19.1 CM/SEC
BH CV XLRA MEAS LEFT VERTEBRAL A PSV: 52.9 CM/SEC
BH CV XLRA MEAS RIGHT CAROTID BULB EDV: -15.7 CM/SEC
BH CV XLRA MEAS RIGHT CAROTID BULB PSV: -62.7 CM/SEC
BH CV XLRA MEAS RIGHT DIST CCA EDV: 18.2 CM/SEC
BH CV XLRA MEAS RIGHT DIST CCA PSV: 88.4 CM/SEC
BH CV XLRA MEAS RIGHT DIST ICA EDV: -31.4 CM/SEC
BH CV XLRA MEAS RIGHT DIST ICA PSV: -89.4 CM/SEC
BH CV XLRA MEAS RIGHT ICA/CCA RATIO: 1.19
BH CV XLRA MEAS RIGHT MID CCA EDV: 21.7 CM/SEC
BH CV XLRA MEAS RIGHT MID CCA PSV: 113.5 CM/SEC
BH CV XLRA MEAS RIGHT MID ICA EDV: -34.5 CM/SEC
BH CV XLRA MEAS RIGHT MID ICA PSV: -105.1 CM/SEC
BH CV XLRA MEAS RIGHT PROX CCA EDV: 19.1 CM/SEC
BH CV XLRA MEAS RIGHT PROX CCA PSV: 102.2 CM/SEC
BH CV XLRA MEAS RIGHT PROX ECA EDV: -15.6 CM/SEC
BH CV XLRA MEAS RIGHT PROX ECA PSV: -129.1 CM/SEC
BH CV XLRA MEAS RIGHT PROX ICA EDV: -16.5 CM/SEC
BH CV XLRA MEAS RIGHT PROX ICA PSV: -51 CM/SEC
BH CV XLRA MEAS RIGHT PROX SCLA PSV: 159.1 CM/SEC
BH CV XLRA MEAS RIGHT VERTEBRAL A EDV: -11 CM/SEC
BH CV XLRA MEAS RIGHT VERTEBRAL A PSV: -38.4 CM/SEC
LEFT ARM BP: NORMAL MMHG
RIGHT ARM BP: NORMAL MMHG
UPPER ARTERIAL LEFT ARM BRACHIAL SYS MAX: NORMAL
UPPER ARTERIAL RIGHT ARM BRACHIAL SYS MAX: NORMAL

## 2024-08-09 PROCEDURE — 93880 EXTRACRANIAL BILAT STUDY: CPT

## 2024-08-09 PROCEDURE — 93922 UPR/L XTREMITY ART 2 LEVELS: CPT

## 2024-08-09 NOTE — PATIENT INSTRUCTIONS
"\"5-2-6-Almost None!\"  Healthy Habits Start Early    EAT 5 OR MORE SERVINGS OF VEGETABLES AND FRUITS EVERY DAY.    Help Justice get three vegetables and two fruits each day. Red, green, yellow, orange...encourage them to try all the colors so they can enjoy different flavors and get more vitamins.    How can I help Justice do this?  ---------------------------------------------  -BE PATIENT WITH Justice, remember it may take 10 times before they start to like new food. So, start with small bites and just keep trying.  -Serve at least one vegetable or fruit at every meal. Even try two. Remember, portions do not have to be as big as you think.  -Encourage eating fruits and vegetables instead of drinking them..it's a better way to get fiber and vitamins..so limit the amount of juice to 1/2 cup per day for children 1-6 years and one cup per day for children 7-18 years of age. Try using 1/2 part water and 1/2 part juice.    Spend less than two hours per day watching television and other screen media. Screen media includes video games, movies and computer use for entertainment.    How can I help Justice do this?  -Turn off the TV at dinner. Dinner is the best time to hang out with your kids and just talk, learn about their day, and tell them about your day. Your kids have a lot to learn from you and dinner is a great time to share.  "

## 2024-08-09 NOTE — PROGRESS NOTES
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 Burkett for carotid artery stenosis and peripheral arterial disease.  He had a right carotid endarterectomy in 2016.  He returns today in follow up along with a carotid duplex and ABIs. He  reports he has had kidney stones. He has required lithotripsy. He denies any symptoms consistent with CVA, TIA, or amaurosis fugax. He  denies any, rest pain, or tissue loss. He reports 1 block claudication that has worsened. He also reports difficulty carrying his groceries up the 18 steps to get to his apartment.     Review of Systems   Constitutional:  Negative for fever.   Eyes:  Negative for visual disturbance.   Cardiovascular:  Negative for leg swelling.   Gastrointestinal:  Negative for abdominal pain.   Musculoskeletal:  Negative for back pain.   Skin:  Negative for color change, pallor and wound.   Neurological:  Negative for dizziness, facial asymmetry, speech difficulty and weakness.        Justice Aldridge  reports that he has been smoking cigarettes. He has a 30 pack-year smoking history. He has never used smokeless tobacco..   Tobacco Education/Cessation: Justice Aldridge  reports that he has been smoking cigarettes. He has a 30 pack-year smoking history. He has never used smokeless tobacco. I have educated him on the risk of diseases from using tobacco products such as cancer, COPD, heart disease, and arterial disease.     I advised him to quit and he is not willing to quit.    I spent 3  minutes counseling the patient.           Objective   Vital Signs:  Vitals:    08/09/24 1441   BP: 138/78      Body mass index is 36.9 kg/m².   Class 2 Severe Obesity (BMI >=35 and <=39.9). Obesity-related health conditions include the following: peripheral vascular disease. Obesity is  unchanged. BMI is is above average; BMI management plan is completed. We discussed portion control, increasing exercise, and Information on healthy weight added to patient's after visit summary.        Physical Exam  Vitals reviewed.   Constitutional:       Appearance: Normal appearance.   HENT:      Head: Normocephalic.   Cardiovascular:      Rate and Rhythm: Normal rate and regular rhythm.      Pulses: Normal pulses.           Dorsalis pedis pulses are 3+ on the right side and 3+ on the left side.        Posterior tibial pulses are 3+ on the right side and 3+ on the left side.   Pulmonary:      Effort: Pulmonary effort is normal.   Skin:     General: Skin is warm.   Neurological:      General: No focal deficit present.      Mental Status: He is alert and oriented to person, place, and time.   Psychiatric:         Mood and Affect: Mood normal.          Result Review :      Previous carotid duplex: Less than 50% stenosis bilaterally    Carotid duplex from today:Duplex Carotid Ultrasound CAR (08/09/2024 14:34)     Previous ABIs: 0.80 on the right and 0.64 on the left.    ABIs today: Doppler Ankle Brachial Index Single Level CAR (08/09/2024 14:34)                    Assessment and Plan     Diagnoses and all orders for this visit:    1. Bilateral carotid artery stenosis (Primary)    2. Peripheral vascular disease  -     Doppler Ankle Brachial Index Single Level CAR; Future    3. Carotid stenosis, bilateral  -     Duplex Carotid Ultrasound CAR; Future    4. Type 2 diabetes mellitus with diabetic neuropathic arthropathy, without long-term current use of insulin             Patient presents today for ongoing management of his  carotid artery stenosis and peripheral arterial disease. He is to continue his antiplatelet agent which is aspirin. He is on a statin for cholesterol control.  We discussed adequate blood pressure control. We discussed diligent foot inspection and better blood sugar control.  He will return in 1  year along with a repeat carotid artery duplex and ABIs.    Follow Up     Return in about 1 year (around 8/9/2025) for carotid duplex, marilee.  Patient was given instructions and counseling regarding his condition or for health maintenance advice. Please see specific information pulled into the AVS if appropriate.     Felipa Mandel, APRN

## 2024-12-02 RX ORDER — CILOSTAZOL 100 MG/1
100 TABLET ORAL 2 TIMES DAILY
Qty: 120 TABLET | Refills: 1 | Status: SHIPPED | OUTPATIENT
Start: 2024-12-02

## 2024-12-03 ENCOUNTER — TELEPHONE (OUTPATIENT)
Age: 69
End: 2024-12-03
Payer: MEDICARE

## 2024-12-03 DIAGNOSIS — I73.9 PERIPHERAL VASCULAR DISEASE: Primary | ICD-10-CM

## 2024-12-03 NOTE — TELEPHONE ENCOUNTER
Pt called and reported pain in both legs , more in the right leg, when he walks up his aprox 18 stairs. Pt denies pain   when sitting or sleeping, pt reports the pain does resolve after sitting for about 15 minutes but the pain makes him almost fall. Pt reports pain from thigh area down to above ankle. Pt next f/u not until 08/2025, pt scheduled for BISHOP scan and appt with APRN 12/12, pt verbalizes understanding.

## 2024-12-05 ENCOUNTER — TELEPHONE (OUTPATIENT)
Age: 69
End: 2024-12-05
Payer: MEDICARE

## 2024-12-05 NOTE — TELEPHONE ENCOUNTER
Spoke with patient. He is having increased pain in both of his legs, right worse than left. He called last week for the same issue but he reports the pain is worsening. At that time, we scheduled a follow up appt for next week. I have moved his appt up even sooner to tomorrow 10/6 for BISHOP and follow up visit. He is aware and understands.

## 2024-12-05 NOTE — TELEPHONE ENCOUNTER
Caller: Justice Aldridge    Relationship: Self    Best call back number: [phone]    What is the best time to reach you: ANY    Who are you requesting to speak with (clinical staff, provider,  specific staff member): CLINICAL    Do you know the name of the person who called: PT    What was the call regarding: PT HAS SOME QUESTIONS ABOUT HIS APPT AND HIS LEGS BOTHERING HIM. STATES HE HAD TO GET ON HIS KNEES AND CRAWL TO THE COUCH LASTNIGHT. PLEASE GIVE HIM A CALL BACK ASAP. THANK YOU    Is it okay if the provider responds through MyChart: NO

## 2024-12-06 ENCOUNTER — OFFICE VISIT (OUTPATIENT)
Age: 69
End: 2024-12-06
Payer: MEDICARE

## 2024-12-06 ENCOUNTER — HOSPITAL ENCOUNTER (OUTPATIENT)
Facility: HOSPITAL | Age: 69
Discharge: HOME | End: 2024-12-06
Payer: MEDICARE

## 2024-12-06 VITALS
DIASTOLIC BLOOD PRESSURE: 88 MMHG | BODY MASS INDEX: 35.55 KG/M2 | SYSTOLIC BLOOD PRESSURE: 158 MMHG | WEIGHT: 240 LBS | HEIGHT: 69 IN

## 2024-12-06 DIAGNOSIS — I73.9 PERIPHERAL VASCULAR DISEASE: ICD-10-CM

## 2024-12-06 DIAGNOSIS — I70.0 ATHEROSCLEROSIS OF AORTA: ICD-10-CM

## 2024-12-06 DIAGNOSIS — I73.9 PAD (PERIPHERAL ARTERY DISEASE): Primary | ICD-10-CM

## 2024-12-06 LAB
BH CV LOWER ARTERIAL LEFT ABI RATIO: 0.49
BH CV LOWER ARTERIAL LEFT DORSALIS PEDIS SYS MAX: 60
BH CV LOWER ARTERIAL LEFT POST TIBIAL SYS MAX: 78
BH CV LOWER ARTERIAL RIGHT ABI RATIO: 0.32
BH CV LOWER ARTERIAL RIGHT DORSALIS PEDIS SYS MAX: 50
BH CV LOWER ARTERIAL RIGHT POST TIBIAL SYS MAX: 0
UPPER ARTERIAL LEFT ARM BRACHIAL SYS MAX: NORMAL
UPPER ARTERIAL RIGHT ARM BRACHIAL SYS MAX: NORMAL

## 2024-12-06 PROCEDURE — 93922 UPR/L XTREMITY ART 2 LEVELS: CPT

## 2024-12-06 NOTE — PROGRESS NOTES
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 Burkett for carotid artery stenosis and peripheral arterial disease.  He had a right carotid endarterectomy in 2016.  He comes in today for worsening pain in his legs. He states this has been going on for over a month.  He reports it is generally difficult to carry his groceries up the stairs.  He has symptoms worse on the right    Review of Systems   Constitutional:  Negative for fever.   Eyes:  Negative for visual disturbance.   Cardiovascular:  Negative for leg swelling.   Gastrointestinal:  Negative for abdominal pain.   Musculoskeletal:  Negative for back pain.   Skin:  Negative for color change, pallor and wound.   Neurological:  Negative for dizziness, facial asymmetry, speech difficulty and weakness.        Justice Aldridge  reports that he has been smoking cigarettes. He has a 30 pack-year smoking history. He has never used smokeless tobacco..   Tobacco Education/Cessation: Justice Aldridge  reports that he has been smoking cigarettes. He has a 30 pack-year smoking history. He has never used smokeless tobacco. I have educated him on the risk of diseases from using tobacco products such as cancer, COPD, heart disease, and arterial disease.     I advised him to quit and he is not willing to quit.    I spent 3  minutes counseling the patient.           Objective   Vital Signs:  Vitals:    12/06/24 1533   BP: 158/88        Body mass index is 35.42 kg/m².   Class 2 Severe Obesity (BMI >=35 and <=39.9). Obesity-related health conditions include the following: peripheral vascular disease. Obesity is unchanged. BMI is is above average; BMI management plan is completed. We discussed portion control, increasing exercise, and Information on healthy weight added to patient's after visit  summary.        Physical Exam  Vitals reviewed.   Constitutional:       Appearance: Normal appearance.   HENT:      Head: Normocephalic.   Cardiovascular:      Rate and Rhythm: Normal rate and regular rhythm.      Pulses: Normal pulses.           Dorsalis pedis pulses are 3+ on the right side and 3+ on the left side.        Posterior tibial pulses are 3+ on the right side and 3+ on the left side.   Pulmonary:      Effort: Pulmonary effort is normal.   Skin:     General: Skin is warm.   Neurological:      General: No focal deficit present.      Mental Status: He is alert and oriented to person, place, and time.   Psychiatric:         Mood and Affect: Mood normal.          Result Review :  COMPREHENSIVE METABOLIC PANEL (02/21/2024 16:58)     Previous carotid duplex: Less than 50% stenosis bilaterally    Carotid duplex from today:Duplex Carotid Ultrasound CAR (08/09/2024 14:34)     Previous ABIs: Doppler Ankle Brachial Index Single Level CAR (08/09/2024 14:34)     ABIs today: Doppler Ankle Brachial Index Single Level CAR (12/06/2024 15:32)                    Assessment and Plan     Diagnoses and all orders for this visit:    1. PAD (peripheral artery disease) (Primary)  -     CT Angio Abdominal Aorta Bilateral Iliofem Runoff; Future  -     CT IV Fluids Pre Post; Future    2. Atherosclerosis of aorta  -     CT Angio Abdominal Aorta Bilateral Iliofem Runoff; Future               Patient presents today for worsening pain to the bilateral lower extremities, with the worst being on the right.  He complains of shortness of claudication.  He had no rest pain or tissue loss.  Today, his BISHOP in the right is 0.3 to and the left is 0.49 which is decreased from his previous imaging 3 months ago.  I recommended that he proceed with a CT angiogram of the abdomen pelvis bilateral lower extremity runoff.  He does have history of renal insufficiency therefore we will prehydrated him with saline protocol.  He will have this done at his  earliest convenience and return to the office to discuss results Dr. Burkett    Follow Up     Return in about 2 weeks (around 12/20/2024) for see MEHRDAD for CTA review .  Patient was given instructions and counseling regarding his condition or for health maintenance advice. Please see specific information pulled into the AVS if appropriate.     JESSENIA Caceres

## 2024-12-09 RX ORDER — SODIUM CHLORIDE 9 MG/ML
100 INJECTION, SOLUTION INTRAVENOUS CONTINUOUS
Status: CANCELLED | OUTPATIENT
Start: 2024-12-11

## 2024-12-11 ENCOUNTER — HOSPITAL ENCOUNTER (OUTPATIENT)
Dept: CT IMAGING | Facility: HOSPITAL | Age: 69
Discharge: HOME | End: 2024-12-11
Payer: MEDICARE

## 2024-12-11 ENCOUNTER — HOSPITAL ENCOUNTER (OUTPATIENT)
Dept: RADIOLOGY | Facility: HOSPITAL | Age: 69
Discharge: HOME | End: 2024-12-11
Payer: MEDICARE

## 2024-12-11 DIAGNOSIS — I73.9 PERIPHERAL VASCULAR DISEASE: Primary | ICD-10-CM

## 2024-12-11 DIAGNOSIS — I73.9 PAD (PERIPHERAL ARTERY DISEASE): ICD-10-CM

## 2024-12-11 DIAGNOSIS — I70.0 ATHEROSCLEROSIS OF AORTA: ICD-10-CM

## 2024-12-11 LAB — CREAT BLDA-MCNC: 1.2 MG/DL (ref 0.6–1.3)

## 2024-12-11 PROCEDURE — 75635 CT ANGIO ABDOMINAL ARTERIES: CPT

## 2024-12-11 PROCEDURE — 96360 HYDRATION IV INFUSION INIT: CPT

## 2024-12-11 PROCEDURE — 25810000003 SODIUM CHLORIDE 0.9 % SOLUTION: Performed by: NURSE PRACTITIONER

## 2024-12-11 PROCEDURE — 82565 ASSAY OF CREATININE: CPT

## 2024-12-11 PROCEDURE — 96361 HYDRATE IV INFUSION ADD-ON: CPT

## 2024-12-11 PROCEDURE — 25510000001 IOPAMIDOL PER 1 ML: Performed by: NURSE PRACTITIONER

## 2024-12-11 RX ORDER — IOPAMIDOL 755 MG/ML
100 INJECTION, SOLUTION INTRAVASCULAR
Status: COMPLETED | OUTPATIENT
Start: 2024-12-11 | End: 2024-12-11

## 2024-12-11 RX ORDER — SODIUM CHLORIDE 9 MG/ML
100 INJECTION, SOLUTION INTRAVENOUS CONTINUOUS
Status: CANCELLED | OUTPATIENT
Start: 2024-12-11

## 2024-12-11 RX ORDER — SODIUM CHLORIDE 9 MG/ML
100 INJECTION, SOLUTION INTRAVENOUS CONTINUOUS
Status: ACTIVE | OUTPATIENT
Start: 2024-12-11 | End: 2024-12-11

## 2024-12-11 RX ADMIN — IOPAMIDOL 95 ML: 755 INJECTION, SOLUTION INTRAVENOUS at 10:57

## 2024-12-11 RX ADMIN — SODIUM CHLORIDE 100 ML/HR: 900 INJECTION INTRAVENOUS at 11:10

## 2024-12-11 RX ADMIN — SODIUM CHLORIDE 500 ML: 9 INJECTION, SOLUTION INTRAVENOUS at 09:42

## 2024-12-12 NOTE — PROGRESS NOTES
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  Recently developed worsening short distance claudication of both lower extremities without rest pain or tissue loss.  Cilostazol was prescribed, but the patient said he did not know.    Objective   Vital Signs:  Ht 175.3 cm (69.02\")   Wt 109 kg (240 lb)   BMI 35.42 kg/m²   Estimated body mass index is 35.42 kg/m² as calculated from the following:    Height as of this encounter: 175.3 cm (69.02\").    Weight as of this encounter: 109 kg (240 lb).        Justice Aldridge  reports that he has been smoking cigarettes. He has a 30 pack-year smoking history. He has never used smokeless tobacco.. Tobacco Education/Cessation: Justice Aldridge  reports that he has been smoking cigarettes. He has a 30 pack-year smoking history. He has never used smokeless tobacco. I have educated him on the risk of diseases from using tobacco products such as arterial disease. I advised him to quit and he is not willing to quit.    I spent  0  minutes counseling the patient.    Exam: Patient evaluated in wheelchair.  Overwhelming    Personal review of data: Progress note 12/6/2024.  Right/left LE ABIs 12/6/2024 0.32/0.49  Point-of-care creatinine 12/11/2024 was 1.2.   Images of CT angiogram a.m. aorta with bilateral lower extremity runoff 12/11/2024     Assessment and Plan     Diagnoses and all orders for this visit:    1. Atherosclerosis of native arteries of extremities with intermittent claudication, bilateral legs (Primary)    2. Atherosclerosis of aorta    Summary: 69-year-old diabetic gentleman who smokes with history of cerebrovascular disease post right CEA 2016, followed for carotid and lower extremity atherosclerosis.  Takes aspirin, cilostazol and pravastatin.  Recently experienced onset of short distance claudication both lower extremities with " drop in ABIs.  CT angiogram demonstrates multiple bilateral renal arteries, all patent.  There is aortic atherosclerosis with moderate mid aortic stenosis proximal and adjacent to the level of the SHELBY origin.  There is distal right common iliac artery stenosis with patent right external iliac and common femoral arteries.  There is left common iliac artery occlusion, with reconstitution of the small external iliac artery.  The common femoral artery is normal.  There is apparent nonocclusive right superficial femoral artery atherosclerosis and stenosis.  From an anatomic perspective he appears to be a candidate for bilateral groin approach aortogram with bilateral iliac arteriogram, recanalization of total left common iliac artery occlusion and stent angioplasty of the iliac arteries and possibly of the aorta.  Another option is aortobifemoral bypass.  Alternatively, the patient does not have rest pain or tissue loss, and so cilostazol treatment is another option.  After hearing about this, the patient is interested in wanting to pursue potential medical, nonsurgical treatment first.  I confirmed by showing him the prescription that was entered to the [facility] in [place] 12/2/2024 for cilostazol 100 mg twice daily.  He will telephone the pharmacy to ensure that the medicine has not been restocked.  Discussed about potential side effects.  Questions answered.  Return in 6 weeks to discuss response, sooner as needed.    Follow Up     Return in about 6 weeks (around 1/24/2025).  Patient was given instructions and counseling regarding his condition or for health maintenance advice. Please see specific information pulled into the AVS if appropriate.

## 2024-12-13 ENCOUNTER — OFFICE VISIT (OUTPATIENT)
Age: 69
End: 2024-12-13
Payer: MEDICARE

## 2024-12-13 VITALS — BODY MASS INDEX: 35.55 KG/M2 | WEIGHT: 240 LBS | HEIGHT: 69 IN

## 2024-12-13 DIAGNOSIS — I70.213 ATHEROSCLEROSIS OF NATIVE ARTERIES OF EXTREMITIES WITH INTERMITTENT CLAUDICATION, BILATERAL LEGS: Primary | ICD-10-CM

## 2024-12-13 DIAGNOSIS — I70.0 ATHEROSCLEROSIS OF AORTA: ICD-10-CM

## 2025-01-24 ENCOUNTER — OFFICE VISIT (OUTPATIENT)
Age: 70
End: 2025-01-24
Payer: MEDICARE

## 2025-01-24 VITALS
WEIGHT: 243.5 LBS | DIASTOLIC BLOOD PRESSURE: 83 MMHG | TEMPERATURE: 97.3 F | BODY MASS INDEX: 36.07 KG/M2 | RESPIRATION RATE: 16 BRPM | SYSTOLIC BLOOD PRESSURE: 127 MMHG | HEART RATE: 102 BPM | HEIGHT: 69 IN | OXYGEN SATURATION: 99 %

## 2025-01-24 DIAGNOSIS — I73.9 PERIPHERAL VASCULAR DISEASE: Primary | ICD-10-CM

## 2025-01-24 NOTE — PROGRESS NOTES
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 Burkett for carotid artery stenosis and peripheral arterial disease.  He had a right carotid endarterectomy in 2016.  He was in our office in December 2024 for worsening claudication symptoms and possible rest pain.  Time, his right BISHOP had dropped to 0.32 and the left was 0.49.  He underwent a CT angiogram which demonstrated aortic atherosclerosis with moderate mid aortic stenosis proximal and adjacent to the level of the SHELBY origin.  There is distal right common iliac artery stenosis with patent right external iliac and common femoral arteries.  There is left common iliac artery occlusion, with reconstitution of the small external iliac artery.  The common femoral artery is normal.  There is apparent nonocclusive right superficial femoral artery atherosclerosis and stenosis.    From an anatomic perspective he appears to be a candidate for bilateral groin approach aortogram with bilateral iliac arteriogram, recanalization of total left common iliac artery occlusion and stent angioplasty of the iliac arteries and possibly of the aorta.  Another option is aortobifemoral bypass.  He had decided to pursue medical management only with cilostazol.  He comes in today for follow-up to reassess his response to this. He reports that he has not seen any improvement in his symptoms. He is interested in surgical management.   Review of Systems   Constitutional:  Negative for fever.   Eyes:  Negative for visual disturbance.   Cardiovascular:  Negative for leg swelling.   Gastrointestinal:  Negative for abdominal pain.   Musculoskeletal:  Negative for back pain.   Skin:  Negative for color change, pallor and wound.   Neurological:  Negative  for dizziness, facial asymmetry, speech difficulty and weakness.        Justice Aldridge  reports that he has been smoking cigarettes. He has a 30 pack-year smoking history. He has never used smokeless tobacco..   Tobacco Education/Cessation: Justice Aldridge  reports that he has been smoking cigarettes. He has a 30 pack-year smoking history. He has never used smokeless tobacco. I have educated him on the risk of diseases from using tobacco products such as cancer, COPD, heart disease, and arterial disease.     I advised him to quit and he is not willing to quit.    I spent 3  minutes counseling the patient.           Objective   Vital Signs:  Vitals:    01/24/25 1447   BP: 127/83   Pulse: 102   Resp: 16   Temp: 97.3 °F (36.3 °C)   SpO2: 99%          Body mass index is 35.94 kg/m².   Class 2 Severe Obesity (BMI >=35 and <=39.9). Obesity-related health conditions include the following: peripheral vascular disease. Obesity is unchanged. BMI is is above average; BMI management plan is completed. We discussed portion control, increasing exercise, and Information on healthy weight added to patient's after visit summary.        Physical Exam  Vitals reviewed.   Constitutional:       Appearance: Normal appearance.   HENT:      Head: Normocephalic.   Cardiovascular:      Rate and Rhythm: Normal rate and regular rhythm.      Pulses: Normal pulses.           Dorsalis pedis pulses are 3+ on the right side and 3+ on the left side.        Posterior tibial pulses are 3+ on the right side and 3+ on the left side.   Pulmonary:      Effort: Pulmonary effort is normal.   Skin:     General: Skin is warm.   Neurological:      General: No focal deficit present.      Mental Status: He is alert and oriented to person, place, and time.   Psychiatric:         Mood and Affect: Mood normal.          Result Review :  Duplex Carotid Ultrasound CAR (08/09/2024 14:34)          Doppler Ankle Brachial Index Single Level CAR (12/06/2024 15:32)        CT Angio Abdominal Aorta Bilateral Iliofem Runoff (12/11/2024 11:00)              Assessment and Plan     Diagnoses and all orders for this visit:    1. Peripheral vascular disease (Primary)  -     Doppler Ankle Brachial Index Single Level CAR; Future                 Patient presents today follow-up of his peripheral arterial disease.  He had a drop in his ABIs at the last visit and a CT angiogram showed significant disease.  He had elected for nonoperative management with cilostazol and returns today to discuss his symptoms.  He reports no change in his symptoms and is wanting to proceed with surgical management.  I did discuss continuing his aspirin and Pletal.  We will get him back in the office in the next few weeks for repeat ABIs and he will talk with Dr. Burkett about surgical management.  He has no interest in smoking cessation and we discussed that he has very high risk for amputation and he understands this.  I have at least asked him to decrease his cigarettes from 20/day to 15.    Follow Up     Return in about 4 weeks (around 2/21/2025) for marilee; see MEHRDAD.  Patient was given instructions and counseling regarding his condition or for health maintenance advice. Please see specific information pulled into the AVS if appropriate.     JESSENIA Caceres

## 2025-02-06 ENCOUNTER — TELEPHONE (OUTPATIENT)
Age: 70
End: 2025-02-06
Payer: MEDICARE

## 2025-02-06 NOTE — TELEPHONE ENCOUNTER
Pt called stating that he is having increased pain in legs, can hardly walk, and when he gets to the top of his stairs, he feels like he is going to fall. I rescheduled his appt to 2/7 at 11am due to his last office visit with jaspreet.

## 2025-02-07 ENCOUNTER — OFFICE VISIT (OUTPATIENT)
Age: 70
End: 2025-02-07
Payer: MEDICARE

## 2025-02-07 ENCOUNTER — HOSPITAL ENCOUNTER (OUTPATIENT)
Facility: HOSPITAL | Age: 70
Discharge: HOME | End: 2025-02-07
Payer: MEDICARE

## 2025-02-07 VITALS
DIASTOLIC BLOOD PRESSURE: 70 MMHG | BODY MASS INDEX: 35.99 KG/M2 | SYSTOLIC BLOOD PRESSURE: 132 MMHG | WEIGHT: 243 LBS | HEIGHT: 69 IN

## 2025-02-07 DIAGNOSIS — I73.9 PERIPHERAL VASCULAR DISEASE: ICD-10-CM

## 2025-02-07 DIAGNOSIS — I70.213 ATHEROSCLEROSIS OF NATIVE ARTERIES OF EXTREMITIES WITH INTERMITTENT CLAUDICATION, BILATERAL LEGS: Primary | ICD-10-CM

## 2025-02-07 LAB
BH CV LOWER ARTERIAL LEFT ABI RATIO: 0.54
BH CV LOWER ARTERIAL LEFT DORSALIS PEDIS SYS MAX: 72
BH CV LOWER ARTERIAL LEFT POST TIBIAL SYS MAX: 72
BH CV LOWER ARTERIAL RIGHT ABI RATIO: 0.32
BH CV LOWER ARTERIAL RIGHT DORSALIS PEDIS SYS MAX: 42
BH CV LOWER ARTERIAL RIGHT POST TIBIAL SYS MAX: 0
UPPER ARTERIAL LEFT ARM BRACHIAL SYS MAX: NORMAL
UPPER ARTERIAL RIGHT ARM BRACHIAL SYS MAX: NORMAL

## 2025-02-07 PROCEDURE — 93922 UPR/L XTREMITY ART 2 LEVELS: CPT

## 2025-02-07 NOTE — H&P (VIEW-ONLY)
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  Tried cilostazol with no improvement, but no side effect.  Recently had toenails trimmed, and now with toe pain bilaterally.  Continues to smoke.    Objective   Vital Signs:  /70   Ht 175.3 cm (69.02\")   Wt 110 kg (243 lb)   BMI 35.86 kg/m²   Estimated body mass index is 35.86 kg/m² as calculated from the following:    Height as of this encounter: 175.3 cm (69.02\").    Weight as of this encounter: 110 kg (243 lb).        Justice Aldridge  reports that he has been smoking cigarettes. He has a 30 pack-year smoking history. He has never used smokeless tobacco.. Tobacco Education/Cessation: Justice Aldridge  reports that he has been smoking cigarettes. He has a 30 pack-year smoking history. He has never used smokeless tobacco. I have educated him on the risk of diseases from using tobacco products such as arterial disease.     Exam: Cyanotic discoloration of the tip of the right great toe adjacent to the toenail.    Personal review of data: Previous records.  Images of CT angiogram 12/11/2024.  Images and tracings of bilateral lower extremity ABIs performed today.  Common labs          2/21/2024    16:58 5/22/2024    13:05 12/11/2024    09:39   Common Labs   Creatinine   1.20    WBC 11.70     11.87        Hemoglobin 17.9     17.1        Hematocrit 52.9     50.4        Platelets 197     161           Details          This result is from an external source.             Assessment and Plan     Diagnoses and all orders for this visit:    1. Atherosclerosis of native arteries of extremities with intermittent claudication, bilateral legs (Primary)  -     Case Request; Standing  -     ceFAZolin (ANCEF) 2,000 mg in sodium chloride 0.9 % 100 mL IVPB  -     Case Request    Other " orders  -     Follow Anesthesia Guidelines / Protocol; Future  -     Follow Anesthesia Guidelines / Protocol; Standing  -     Verify NPO Status; Standing  -     Patient to Void Prior to Transfer to OR; Standing  -     Verify / Perform Chlorhexidine Skin Prep; Standing  -     Provide Patient With Instructions on NPO Status; Future  -     Provide Chlorhexidine Skin Prep Wipes and Instructions; Future  -     Place Sequential Compression Device; Standing  -     Maintain Sequential Compression Device; Standing    Summary: 69-year-old heavy smoker with multilevel peripheral arterial disease of the lower extremities with disabling claudications refractory to cilostazol.  Comes in today having toe pain post recent toenail manipulations, with some cyanosis of the tip of the right great toe adjacent to the nailbed.  Says he is working on smoking cessation but has reduced smoking by 3 cigarettes a day to 17/day.  Goal was 15.  Noninvasive testing demonstrates severe right and moderate left leg ischemia with BISHOP 0.32 and 0.54 on the right and left, respectively, no change.  CT angiogram with aortic stenosis due to plaque or mural thrombus without aneurysm.  There is iliac artery occlusive disease bilaterally with left common iliac artery occlusion.  External iliac and common femoral arteries are patent bilaterally.  Options include percutaneous access for bilateral common and possibly aortic stent angioplasty or aortobifemoral bypass.  His health is not good and he is obese with BMI near 36.  I think an attempt at percutaneous revascularization is warranted.  Will try to move forward with bilateral groin approach aortogram with bilateral iliac arteriograms and iliac and possibly aortic stent angioplasty.  Discussed imaging studies and available options.  Questions answered.  Informed consent.  Med management: Since there was no significant improvement in ambulation distance with cilostazol, will discontinue it.    Follow Up      Return for Follow-up after procedure.  Patient was given instructions and counseling regarding his condition or for health maintenance advice. Please see specific information pulled into the AVS if appropriate.

## 2025-02-12 ENCOUNTER — PRE-ADMISSION TESTING (OUTPATIENT)
Dept: PREADMISSION TESTING | Facility: HOSPITAL | Age: 70
End: 2025-02-12
Payer: MEDICARE

## 2025-02-12 VITALS
HEART RATE: 99 BPM | RESPIRATION RATE: 18 BRPM | SYSTOLIC BLOOD PRESSURE: 125 MMHG | TEMPERATURE: 97 F | OXYGEN SATURATION: 93 % | WEIGHT: 246.1 LBS | HEIGHT: 69 IN | DIASTOLIC BLOOD PRESSURE: 79 MMHG | BODY MASS INDEX: 36.45 KG/M2

## 2025-02-12 LAB
ANION GAP SERPL CALCULATED.3IONS-SCNC: 11 MMOL/L (ref 5–15)
BUN SERPL-MCNC: 18 MG/DL (ref 8–23)
BUN/CREAT SERPL: 15.4 (ref 7–25)
CALCIUM SPEC-SCNC: 9.4 MG/DL (ref 8.6–10.5)
CHLORIDE SERPL-SCNC: 98 MMOL/L (ref 98–107)
CO2 SERPL-SCNC: 25 MMOL/L (ref 22–29)
CREAT SERPL-MCNC: 1.17 MG/DL (ref 0.76–1.27)
DEPRECATED RDW RBC AUTO: 40.4 FL (ref 37–54)
EGFRCR SERPLBLD CKD-EPI 2021: 67.9 ML/MIN/1.73
ERYTHROCYTE [DISTWIDTH] IN BLOOD BY AUTOMATED COUNT: 12.4 % (ref 12.3–15.4)
GLUCOSE SERPL-MCNC: 242 MG/DL (ref 65–99)
HCT VFR BLD AUTO: 50.7 % (ref 37.5–51)
HGB BLD-MCNC: 17.7 G/DL (ref 13–17.7)
MCH RBC QN AUTO: 31.4 PG (ref 26.6–33)
MCHC RBC AUTO-ENTMCNC: 34.9 G/DL (ref 31.5–35.7)
MCV RBC AUTO: 90.1 FL (ref 79–97)
PLATELET # BLD AUTO: 227 10*3/MM3 (ref 140–450)
PMV BLD AUTO: 9.5 FL (ref 6–12)
POTASSIUM SERPL-SCNC: 4.5 MMOL/L (ref 3.5–5.2)
RBC # BLD AUTO: 5.63 10*6/MM3 (ref 4.14–5.8)
SODIUM SERPL-SCNC: 134 MMOL/L (ref 136–145)
WBC NRBC COR # BLD AUTO: 11.56 10*3/MM3 (ref 3.4–10.8)

## 2025-02-12 PROCEDURE — 36415 COLL VENOUS BLD VENIPUNCTURE: CPT

## 2025-02-12 PROCEDURE — 80048 BASIC METABOLIC PNL TOTAL CA: CPT

## 2025-02-12 PROCEDURE — 93005 ELECTROCARDIOGRAM TRACING: CPT

## 2025-02-12 PROCEDURE — 85027 COMPLETE CBC AUTOMATED: CPT

## 2025-02-12 RX ORDER — TIMOLOL MALEATE 5 MG/ML
SOLUTION/ DROPS OPHTHALMIC
COMMUNITY

## 2025-02-12 RX ORDER — INSULIN LISPRO 100 [IU]/ML
INJECTION, SOLUTION INTRAVENOUS; SUBCUTANEOUS
COMMUNITY

## 2025-02-12 RX ORDER — MELOXICAM 15 MG/1
TABLET ORAL
COMMUNITY
End: 2025-02-12

## 2025-02-12 RX ORDER — INSULIN GLARGINE 100 [IU]/ML
INJECTION, SOLUTION SUBCUTANEOUS 2 TIMES DAILY
COMMUNITY
Start: 2024-12-10

## 2025-02-12 NOTE — DISCHARGE INSTRUCTIONS
Take the following medications the morning of surgery:  BRING ALL MEDICATIONS IN LABELED BOTTLES  TAKE CARVEDILOL        If you are on prescription narcotic pain medication to control your pain you may also take that medication the morning of surgery.      General Instructions:     Do not eat solid food after midnight the night before surgery.  Clear liquids day of surgery are allowed but must be stopped at least two hours before your hospital arrival time.       Allowed clear liquids      Water, sodas, and tea or coffee with no cream or milk added.       12 to 20 ounces of a clear liquid that contains carbohydrates is recommended.  If non-diabetic, have Gatorade or Powerade.  If diabetic, have G2 or Powerade Zero.     Do not have liquids red in color.  Do not consume chicken, beef, pork or vegetable broth or bouillon cubes of any variety as they are not considered clear liquids and are not allowed.      Patients who avoid smoking, chewing tobacco and alcohol for 4 weeks prior to surgery have a reduced risk of post-operative complications.  Quit smoking as many days before surgery as you can.  Do not smoke, use chewing tobacco or drink alcohol the day of surgery.   If applicable bring your C-PAP/ BI-PAP machine in with you to preop day of surgery.  Bring any papers given to you in the doctor’s office.  Wear clean comfortable clothes.  Do not wear contact lenses, false eyelashes or make-up.  Bring a case for your glasses.   Bring crutches or walker if applicable.  Remove all piercings.  Leave jewelry and any other valuables at home.  Hair extensions with metal clips must be removed prior to surgery.  The Pre-Admission Testing nurse will instruct you to bring medications if unable to obtain an accurate list in Pre-Admission Testing.            Preventing a Surgical Site Infection:  For 2 to 3 days before surgery, avoid shaving with a razor because the razor can irritate skin and make it easier to develop an infection.     Any areas of open skin can increase the risk of a post-operative wound infection by allowing bacteria to enter and travel throughout the body.  Notify your surgeon if you have any skin wounds / rashes even if it is not near the expected surgical site.  The area will need assessed to determine if surgery should be delayed until it is healed.  The night prior to surgery shower using a fresh bar of anti-bacterial soap (such as Dial) and clean washcloth.  Sleep in a clean bed with clean clothing.  Do not allow pets to sleep with you.  Shower on the morning of surgery using a fresh bar of anti-bacterial soap (such as Dial) and clean washcloth.  Dry with a clean towel and dress in clean clothing.  Ask your surgeon if you will be receiving antibiotics prior to surgery.  Make sure you, your family, and all healthcare providers clean their hands with soap and water or an alcohol based hand  before caring for you or your wound.      CHLORHEXIDINE CLOTH INSTRUCTIONS  The morning of surgery follow these instructions using the Chlorhexidine cloths you've been given.  These steps reduce bacteria on the body.  Do not use the cloths near your eyes, ears mouth, genitalia or on open wounds.  Throw the cloths away after use but do not try to flush them down a toilet.      Open and remove one cloth at a time from the package.    Leave the cloth unfolded and begin the bathing.  Massage the skin with the cloths using gentle pressure to remove bacteria.  Do not scrub harshly.   Follow the steps below with one 2% CHG cloth per area (6 total cloths).  One cloth for neck, shoulders and chest.  One cloth for both arms, hands, fingers and underarms (do underarms last).  One cloth for the abdomen followed by groin.  One cloth for right leg and foot including between the toes.  One cloth for left leg and foot including between the toes.  The last cloth is to be used for the back of the neck, back and buttocks.    Allow the CHG to air  dry 3 minutes on the skin which will give it time to work and decrease the chance of irritation.  The skin may feel sticky until it is dry.  Do not rinse with water or any other liquid or you will lose the beneficial effects of the CHG.  If mild skin irritation occurs, do rinse the skin to remove the CHG.  Report this to the nurse at time of admission.  Do not apply lotions, creams, ointments, deodorants or perfumes after using the clothes. Dress in clean clothes before coming to the hospital        Day of surgery:  Your arrival time is approximately two hours before your scheduled surgery time.  Please note if you have an early arrival time the surgery doors do not open before 5:00 AM.  Upon arrival, a Pre-op nurse and Anesthesiologist will review your health history, obtain vital signs, and answer questions you may have.  The only belongings needed at this time will be a list of your home medications and if applicable your C-PAP/BI-PAP machine.  A Pre-op nurse will start an IV and you may receive medication in preparation for surgery, including something to help you relax.     Please be aware that surgery does come with discomfort.  We want to make every effort to control your discomfort so please discuss any uncontrolled symptoms with your nurse.   Your doctor will most likely have prescribed pain medications.      If you are going home after surgery you will receive individualized written care instructions before being discharged.  A responsible adult must drive you to and from the hospital on the day of your surgery and ideally stay with you through the night.   .  Discharge prescriptions can be filled by the hospital pharmacy during regular pharmacy hours.  If you are having surgery late in the day/evening your prescription may be e-prescribed to your pharmacy.  Please verify your pharmacy hours or chose a 24 hour pharmacy to avoid not having access to your prescription because your pharmacy has closed for the  day.    If you are staying overnight following surgery, you will be transported to your hospital room following the recovery period.  [facility] has all private rooms.    If you have any questions please call Pre-Admission Testing at [phone].  Deductibles and co-payments are collected on the day of service. Please be prepared to pay the required co-pay, deductible or deposit on the day of service as defined by your plan.    Call your surgeon immediately if you experience any of the following symptoms:  Sore Throat  Shortness of Breath or difficulty breathing  Cough  Chills  Body soreness or muscle pain  Headache  Fever  New loss of taste or smell  Do not arrive for your surgery ill.  Your procedure will need to be rescheduled to another time.  You will need to call your physician before the day of surgery to avoid any unnecessary exposure to hospital staff as well as other patients.

## 2025-02-13 LAB
QT INTERVAL: 379 MS
QTC INTERVAL: 472 MS

## 2025-02-18 ENCOUNTER — ANESTHESIA (OUTPATIENT)
Dept: PERIOP | Facility: HOSPITAL | Age: 70
End: 2025-02-18
Payer: MEDICARE

## 2025-02-18 ENCOUNTER — APPOINTMENT (OUTPATIENT)
Dept: GENERAL RADIOLOGY | Facility: HOSPITAL | Age: 70
End: 2025-02-18
Payer: MEDICARE

## 2025-02-18 ENCOUNTER — ANESTHESIA EVENT (OUTPATIENT)
Dept: PERIOP | Facility: HOSPITAL | Age: 70
End: 2025-02-18
Payer: MEDICARE

## 2025-02-18 ENCOUNTER — HOSPITAL ENCOUNTER (INPATIENT)
Facility: HOSPITAL | Age: 70
LOS: 1 days | Discharge: HOME | End: 2025-02-19
Attending: SURGERY | Admitting: SURGERY
Payer: MEDICARE

## 2025-02-18 DIAGNOSIS — I70.213 ATHEROSCLEROSIS OF NATIVE ARTERIES OF EXTREMITIES WITH INTERMITTENT CLAUDICATION, BILATERAL LEGS: ICD-10-CM

## 2025-02-18 LAB
GLUCOSE BLDC GLUCOMTR-MCNC: 291 MG/DL (ref 70–130)
GLUCOSE BLDC GLUCOMTR-MCNC: 293 MG/DL (ref 70–130)
GLUCOSE BLDC GLUCOMTR-MCNC: 326 MG/DL (ref 70–130)

## 2025-02-18 PROCEDURE — 25010000002 LABETALOL 5 MG/ML SOLUTION: Performed by: REGISTERED NURSE

## 2025-02-18 PROCEDURE — 25010000002 CEFAZOLIN PER 500 MG: Performed by: SURGERY

## 2025-02-18 PROCEDURE — 94640 AIRWAY INHALATION TREATMENT: CPT

## 2025-02-18 PROCEDURE — C1894 INTRO/SHEATH, NON-LASER: HCPCS | Performed by: SURGERY

## 2025-02-18 PROCEDURE — 94799 UNLISTED PULMONARY SVC/PX: CPT

## 2025-02-18 PROCEDURE — 94760 N-INVAS EAR/PLS OXIMETRY 1: CPT

## 2025-02-18 PROCEDURE — 37221 PR REVSC OPN/PRQ ILIAC ART W/STNT PLMT & ANGIOPLSTY: CPT | Performed by: SURGERY

## 2025-02-18 PROCEDURE — 25810000003 LACTATED RINGERS PER 1000 ML: Performed by: SURGERY

## 2025-02-18 PROCEDURE — 25010000002 HEPARIN (PORCINE) PER 1000 UNITS: Performed by: REGISTERED NURSE

## 2025-02-18 PROCEDURE — 04703FZ DILATION OF ABDOMINAL AORTA WITH THREE INTRALUMINAL DEVICES, PERCUTANEOUS APPROACH: ICD-10-PCS | Performed by: SURGERY

## 2025-02-18 PROCEDURE — B41D1ZZ FLUOROSCOPY OF AORTA AND BILATERAL LOWER EXTREMITY ARTERIES USING LOW OSMOLAR CONTRAST: ICD-10-PCS | Performed by: SURGERY

## 2025-02-18 PROCEDURE — 25010000002 HEPARIN (PORCINE) PER 1000 UNITS: Performed by: SURGERY

## 2025-02-18 PROCEDURE — 25010000002 ONDANSETRON PER 1 MG: Performed by: REGISTERED NURSE

## 2025-02-18 PROCEDURE — C1769 GUIDE WIRE: HCPCS | Performed by: SURGERY

## 2025-02-18 PROCEDURE — C1887 CATHETER, GUIDING: HCPCS | Performed by: SURGERY

## 2025-02-18 PROCEDURE — 25010000002 SUGAMMADEX 200 MG/2ML SOLUTION: Performed by: ANESTHESIOLOGY

## 2025-02-18 PROCEDURE — 35371 RECHANNELING OF ARTERY: CPT | Performed by: SURGERY

## 2025-02-18 PROCEDURE — C1874 STENT, COATED/COV W/DEL SYS: HCPCS | Performed by: SURGERY

## 2025-02-18 PROCEDURE — C1725 CATH, TRANSLUMIN NON-LASER: HCPCS | Performed by: SURGERY

## 2025-02-18 PROCEDURE — C1876 STENT, NON-COA/NON-COV W/DEL: HCPCS | Performed by: SURGERY

## 2025-02-18 PROCEDURE — 25810000003 LACTATED RINGERS PER 1000 ML: Performed by: ANESTHESIOLOGY

## 2025-02-18 PROCEDURE — 76937 US GUIDE VASCULAR ACCESS: CPT | Performed by: SURGERY

## 2025-02-18 PROCEDURE — 047D3DZ DILATION OF LEFT COMMON ILIAC ARTERY WITH INTRALUMINAL DEVICE, PERCUTANEOUS APPROACH: ICD-10-PCS | Performed by: SURGERY

## 2025-02-18 PROCEDURE — 047J3EZ DILATION OF LEFT EXTERNAL ILIAC ARTERY WITH TWO INTRALUMINAL DEVICES, PERCUTANEOUS APPROACH: ICD-10-PCS | Performed by: SURGERY

## 2025-02-18 PROCEDURE — 047H3DZ DILATION OF RIGHT EXTERNAL ILIAC ARTERY WITH INTRALUMINAL DEVICE, PERCUTANEOUS APPROACH: ICD-10-PCS | Performed by: SURGERY

## 2025-02-18 PROCEDURE — 04CK0ZZ EXTIRPATION OF MATTER FROM RIGHT FEMORAL ARTERY, OPEN APPROACH: ICD-10-PCS | Performed by: SURGERY

## 2025-02-18 PROCEDURE — 25510000001 IOPAMIDOL PER 1 ML: Performed by: SURGERY

## 2025-02-18 PROCEDURE — 63710000001 INSULIN GLARGINE PER 5 UNITS: Performed by: SURGERY

## 2025-02-18 PROCEDURE — C1760 CLOSURE DEV, VASC: HCPCS | Performed by: SURGERY

## 2025-02-18 PROCEDURE — 75625 CONTRAST EXAM ABDOMINL AORTA: CPT

## 2025-02-18 PROCEDURE — 25010000002 DEXAMETHASONE PER 1 MG: Performed by: REGISTERED NURSE

## 2025-02-18 PROCEDURE — 25010000002 HYDROMORPHONE 1 MG/ML SOLUTION: Performed by: REGISTERED NURSE

## 2025-02-18 PROCEDURE — 63710000001 INSULIN LISPRO (HUMAN) PER 5 UNITS: Performed by: SURGERY

## 2025-02-18 PROCEDURE — 25010000002 PROPOFOL 10 MG/ML EMULSION: Performed by: REGISTERED NURSE

## 2025-02-18 PROCEDURE — 94664 DEMO&/EVAL PT USE INHALER: CPT

## 2025-02-18 PROCEDURE — 82948 REAGENT STRIP/BLOOD GLUCOSE: CPT

## 2025-02-18 PROCEDURE — 37236 OPEN/PERQ PLACE STENT 1ST: CPT | Performed by: SURGERY

## 2025-02-18 PROCEDURE — 85347 COAGULATION TIME ACTIVATED: CPT

## 2025-02-18 PROCEDURE — 25010000002 HYDROMORPHONE PER 4 MG: Performed by: REGISTERED NURSE

## 2025-02-18 PROCEDURE — 37223 PR REVSC OPN/PRQ ILIAC ART W/STNT & ANGIOP IPSILATL: CPT | Performed by: SURGERY

## 2025-02-18 PROCEDURE — 94761 N-INVAS EAR/PLS OXIMETRY MLT: CPT

## 2025-02-18 PROCEDURE — 75716 ARTERY X-RAYS ARMS/LEGS: CPT

## 2025-02-18 PROCEDURE — 047C3DZ DILATION OF RIGHT COMMON ILIAC ARTERY WITH INTRALUMINAL DEVICE, PERCUTANEOUS APPROACH: ICD-10-PCS | Performed by: SURGERY

## 2025-02-18 PROCEDURE — 25010000002 MIDAZOLAM PER 1 MG: Performed by: ANESTHESIOLOGY

## 2025-02-18 PROCEDURE — 25010000002 FENTANYL CITRATE (PF) 50 MCG/ML SOLUTION: Performed by: REGISTERED NURSE

## 2025-02-18 PROCEDURE — 04UK0JZ SUPPLEMENT RIGHT FEMORAL ARTERY WITH SYNTHETIC SUBSTITUTE, OPEN APPROACH: ICD-10-PCS | Performed by: SURGERY

## 2025-02-18 PROCEDURE — 25010000002 CEFAZOLIN PER 500 MG: Performed by: ANESTHESIOLOGY

## 2025-02-18 PROCEDURE — 25010000002 SUCCINYLCHOLINE PER 20 MG: Performed by: REGISTERED NURSE

## 2025-02-18 PROCEDURE — 25010000002 PHENYLEPHRINE 10 MG/ML SOLUTION: Performed by: ANESTHESIOLOGY

## 2025-02-18 PROCEDURE — 25010000002 PROTAMINE SULFATE PER 10 MG: Performed by: ANESTHESIOLOGY

## 2025-02-18 PROCEDURE — C1768 GRAFT, VASCULAR: HCPCS | Performed by: SURGERY

## 2025-02-18 DEVICE — VIABAHN BX BALLOON EXP ENDO 8MMX39MM 7FR 135CMCATH HEPARIN
Type: IMPLANTABLE DEVICE | Site: ARTERY FEMORAL | Status: FUNCTIONAL
Brand: GORE VIABAHN VBX BALLOON EXPANDABLE ENDO

## 2025-02-18 DEVICE — STENT PRB35-07-100-120 PROTEGE EF V07
Type: IMPLANTABLE DEVICE | Site: EXTERNAL ILIAC | Status: FUNCTIONAL
Brand: EVERFLEX™ PROTÉGÉ™ EVERFLEX™

## 2025-02-18 DEVICE — LIGACLIP MCA MULTIPLE CLIP APPLIERS, 20 SMALL CLIPS
Type: IMPLANTABLE DEVICE | Site: GROIN | Status: FUNCTIONAL
Brand: LIGACLIP

## 2025-02-18 DEVICE — STENT PRB35-08-060-080 PROTEGE EF V07
Type: IMPLANTABLE DEVICE | Site: ARTERY FEMORAL | Status: FUNCTIONAL
Brand: EVERFLEX™ PROTÉGÉ™ EVERFLEX™

## 2025-02-18 DEVICE — FLOSEAL WITH RECOTHROM - 10ML.
Type: IMPLANTABLE DEVICE | Site: GROIN | Status: FUNCTIONAL
Brand: FLOSEAL HEMOSTATIC MATRIX

## 2025-02-18 DEVICE — VASCU-GUARD IS PREPARED FROM BOVINE PERICARDIUM CROSS-LINKED WITH GLUTARALDEHYDE, AND TREATED WITH 1 MOLAR SODIUM HYDROXIDE FOR A MINIMUM OF 60 MINUTES AT 20-25 DEGREES C. VASCU-GUARD IS TERMINALLY STERILIZED USING GAMMA IRRADIATION. AND PACKAGED WITHIN A DOUBLE-POUCH SYSTEM. THE CONTENTS OF THE UNOPENED, UNDAMAGED OUTER POUCH ARE STERILE.
Type: IMPLANTABLE DEVICE | Site: GROIN | Status: FUNCTIONAL
Brand: VASCU-GUARD

## 2025-02-18 DEVICE — IMPLANTABLE DEVICE: Type: IMPLANTABLE DEVICE | Site: ARTERY FEMORAL | Status: FUNCTIONAL

## 2025-02-18 DEVICE — LIGACLIP MCA MULTIPLE CLIP APPLIERS, 20 MEDIUM CLIPS
Type: IMPLANTABLE DEVICE | Site: GROIN | Status: FUNCTIONAL
Brand: LIGACLIP

## 2025-02-18 RX ORDER — HYDROMORPHONE HYDROCHLORIDE 1 MG/ML
0.25 INJECTION, SOLUTION INTRAMUSCULAR; INTRAVENOUS; SUBCUTANEOUS
Status: DISCONTINUED | OUTPATIENT
Start: 2025-02-18 | End: 2025-02-19 | Stop reason: HOSPADM

## 2025-02-18 RX ORDER — SODIUM CHLORIDE 0.9 % (FLUSH) 0.9 %
3 SYRINGE (ML) INJECTION EVERY 12 HOURS SCHEDULED
Status: DISCONTINUED | OUTPATIENT
Start: 2025-02-18 | End: 2025-02-18 | Stop reason: HOSPADM

## 2025-02-18 RX ORDER — LABETALOL HYDROCHLORIDE 5 MG/ML
5 INJECTION, SOLUTION INTRAVENOUS
Status: DISCONTINUED | OUTPATIENT
Start: 2025-02-18 | End: 2025-02-19 | Stop reason: HOSPADM

## 2025-02-18 RX ORDER — MIDAZOLAM HYDROCHLORIDE 1 MG/ML
0.5 INJECTION, SOLUTION INTRAMUSCULAR; INTRAVENOUS
Status: DISCONTINUED | OUTPATIENT
Start: 2025-02-18 | End: 2025-02-18 | Stop reason: HOSPADM

## 2025-02-18 RX ORDER — PROTAMINE SULFATE 10 MG/ML
INJECTION, SOLUTION INTRAVENOUS AS NEEDED
Status: DISCONTINUED | OUTPATIENT
Start: 2025-02-18 | End: 2025-02-18 | Stop reason: SURG

## 2025-02-18 RX ORDER — HYDROCODONE BITARTRATE AND ACETAMINOPHEN 5; 325 MG/1; MG/1
1 TABLET ORAL ONCE AS NEEDED
Status: DISCONTINUED | OUTPATIENT
Start: 2025-02-18 | End: 2025-02-19 | Stop reason: HOSPADM

## 2025-02-18 RX ORDER — SUCCINYLCHOLINE CHLORIDE 20 MG/ML
INJECTION INTRAMUSCULAR; INTRAVENOUS AS NEEDED
Status: DISCONTINUED | OUTPATIENT
Start: 2025-02-18 | End: 2025-02-18 | Stop reason: SURG

## 2025-02-18 RX ORDER — PROMETHAZINE HYDROCHLORIDE 25 MG/1
25 SUPPOSITORY RECTAL ONCE AS NEEDED
Status: DISCONTINUED | OUTPATIENT
Start: 2025-02-18 | End: 2025-02-19 | Stop reason: HOSPADM

## 2025-02-18 RX ORDER — HEPARIN SODIUM 1000 [USP'U]/ML
INJECTION, SOLUTION INTRAVENOUS; SUBCUTANEOUS AS NEEDED
Status: DISCONTINUED | OUTPATIENT
Start: 2025-02-18 | End: 2025-02-18 | Stop reason: SURG

## 2025-02-18 RX ORDER — CLOPIDOGREL BISULFATE 75 MG/1
75 TABLET ORAL DAILY
Status: DISCONTINUED | OUTPATIENT
Start: 2025-02-18 | End: 2025-02-19 | Stop reason: HOSPADM

## 2025-02-18 RX ORDER — NALOXONE HCL 0.4 MG/ML
0.4 VIAL (ML) INJECTION
Status: DISCONTINUED | OUTPATIENT
Start: 2025-02-18 | End: 2025-02-19 | Stop reason: HOSPADM

## 2025-02-18 RX ORDER — ATORVASTATIN CALCIUM 40 MG/1
40 TABLET, FILM COATED ORAL DAILY
COMMUNITY
Start: 2025-02-13

## 2025-02-18 RX ORDER — EPHEDRINE SULFATE 50 MG/ML
5 INJECTION, SOLUTION INTRAVENOUS ONCE AS NEEDED
Status: DISCONTINUED | OUTPATIENT
Start: 2025-02-18 | End: 2025-02-19 | Stop reason: HOSPADM

## 2025-02-18 RX ORDER — IPRATROPIUM BROMIDE AND ALBUTEROL SULFATE 2.5; .5 MG/3ML; MG/3ML
3 SOLUTION RESPIRATORY (INHALATION) ONCE AS NEEDED
Status: DISCONTINUED | OUTPATIENT
Start: 2025-02-18 | End: 2025-02-19 | Stop reason: HOSPADM

## 2025-02-18 RX ORDER — HYDROCODONE BITARTRATE AND ACETAMINOPHEN 7.5; 325 MG/1; MG/1
1 TABLET ORAL EVERY 4 HOURS PRN
Status: DISCONTINUED | OUTPATIENT
Start: 2025-02-18 | End: 2025-02-19 | Stop reason: HOSPADM

## 2025-02-18 RX ORDER — DIPHENHYDRAMINE HYDROCHLORIDE 50 MG/ML
12.5 INJECTION INTRAMUSCULAR; INTRAVENOUS
Status: DISCONTINUED | OUTPATIENT
Start: 2025-02-18 | End: 2025-02-19 | Stop reason: HOSPADM

## 2025-02-18 RX ORDER — GABAPENTIN 300 MG/1
600 CAPSULE ORAL DAILY
Status: DISCONTINUED | OUTPATIENT
Start: 2025-02-19 | End: 2025-02-19 | Stop reason: HOSPADM

## 2025-02-18 RX ORDER — NALOXONE HCL 0.4 MG/ML
0.2 VIAL (ML) INJECTION AS NEEDED
Status: DISCONTINUED | OUTPATIENT
Start: 2025-02-18 | End: 2025-02-19 | Stop reason: HOSPADM

## 2025-02-18 RX ORDER — HYDRALAZINE HYDROCHLORIDE 20 MG/ML
5 INJECTION INTRAMUSCULAR; INTRAVENOUS
Status: DISCONTINUED | OUTPATIENT
Start: 2025-02-18 | End: 2025-02-19 | Stop reason: HOSPADM

## 2025-02-18 RX ORDER — PROMETHAZINE HYDROCHLORIDE 25 MG/1
25 TABLET ORAL ONCE AS NEEDED
Status: DISCONTINUED | OUTPATIENT
Start: 2025-02-18 | End: 2025-02-19 | Stop reason: HOSPADM

## 2025-02-18 RX ORDER — FLUMAZENIL 0.1 MG/ML
0.2 INJECTION INTRAVENOUS AS NEEDED
Status: DISCONTINUED | OUTPATIENT
Start: 2025-02-18 | End: 2025-02-19 | Stop reason: HOSPADM

## 2025-02-18 RX ORDER — PHENYLEPHRINE HYDROCHLORIDE 10 MG/ML
INJECTION INTRAVENOUS AS NEEDED
Status: DISCONTINUED | OUTPATIENT
Start: 2025-02-18 | End: 2025-02-18 | Stop reason: SURG

## 2025-02-18 RX ORDER — DEXAMETHASONE SODIUM PHOSPHATE 4 MG/ML
INJECTION, SOLUTION INTRA-ARTICULAR; INTRALESIONAL; INTRAMUSCULAR; INTRAVENOUS; SOFT TISSUE AS NEEDED
Status: DISCONTINUED | OUTPATIENT
Start: 2025-02-18 | End: 2025-02-18 | Stop reason: SURG

## 2025-02-18 RX ORDER — ASPIRIN 81 MG/1
81 TABLET, CHEWABLE ORAL DAILY
Status: DISCONTINUED | OUTPATIENT
Start: 2025-02-19 | End: 2025-02-19 | Stop reason: HOSPADM

## 2025-02-18 RX ORDER — IOPAMIDOL 510 MG/ML
200 INJECTION, SOLUTION INTRAVASCULAR
Status: COMPLETED | OUTPATIENT
Start: 2025-02-18 | End: 2025-02-18

## 2025-02-18 RX ORDER — GABAPENTIN 600 MG/1
1 TABLET ORAL EVERY 12 HOURS SCHEDULED
COMMUNITY
Start: 2025-02-14

## 2025-02-18 RX ORDER — ATROPINE SULFATE 0.4 MG/ML
0.4 INJECTION, SOLUTION INTRAMUSCULAR; INTRAVENOUS; SUBCUTANEOUS ONCE AS NEEDED
Status: DISCONTINUED | OUTPATIENT
Start: 2025-02-18 | End: 2025-02-19 | Stop reason: HOSPADM

## 2025-02-18 RX ORDER — OXYCODONE HYDROCHLORIDE 5 MG/1
5 TABLET ORAL EVERY 6 HOURS PRN
Status: DISCONTINUED | OUTPATIENT
Start: 2025-02-18 | End: 2025-02-19 | Stop reason: HOSPADM

## 2025-02-18 RX ORDER — IPRATROPIUM BROMIDE AND ALBUTEROL SULFATE 2.5; .5 MG/3ML; MG/3ML
3 SOLUTION RESPIRATORY (INHALATION)
Status: DISCONTINUED | OUTPATIENT
Start: 2025-02-18 | End: 2025-02-18 | Stop reason: HOSPADM

## 2025-02-18 RX ORDER — FENTANYL CITRATE 50 UG/ML
25 INJECTION, SOLUTION INTRAMUSCULAR; INTRAVENOUS
Status: DISCONTINUED | OUTPATIENT
Start: 2025-02-18 | End: 2025-02-19 | Stop reason: HOSPADM

## 2025-02-18 RX ORDER — ROCURONIUM BROMIDE 10 MG/ML
INJECTION, SOLUTION INTRAVENOUS AS NEEDED
Status: DISCONTINUED | OUTPATIENT
Start: 2025-02-18 | End: 2025-02-18 | Stop reason: SURG

## 2025-02-18 RX ORDER — SODIUM CHLORIDE, SODIUM LACTATE, POTASSIUM CHLORIDE, CALCIUM CHLORIDE 600; 310; 30; 20 MG/100ML; MG/100ML; MG/100ML; MG/100ML
9 INJECTION, SOLUTION INTRAVENOUS CONTINUOUS
Status: DISCONTINUED | OUTPATIENT
Start: 2025-02-18 | End: 2025-02-19

## 2025-02-18 RX ORDER — FAMOTIDINE 10 MG/ML
20 INJECTION, SOLUTION INTRAVENOUS ONCE
Status: COMPLETED | OUTPATIENT
Start: 2025-02-18 | End: 2025-02-18

## 2025-02-18 RX ORDER — SODIUM CHLORIDE, SODIUM LACTATE, POTASSIUM CHLORIDE, CALCIUM CHLORIDE 600; 310; 30; 20 MG/100ML; MG/100ML; MG/100ML; MG/100ML
50 INJECTION, SOLUTION INTRAVENOUS CONTINUOUS
Status: DISCONTINUED | OUTPATIENT
Start: 2025-02-18 | End: 2025-02-19

## 2025-02-18 RX ORDER — SODIUM CHLORIDE 0.9 % (FLUSH) 0.9 %
3-10 SYRINGE (ML) INJECTION AS NEEDED
Status: DISCONTINUED | OUTPATIENT
Start: 2025-02-18 | End: 2025-02-18 | Stop reason: HOSPADM

## 2025-02-18 RX ORDER — CARVEDILOL 3.12 MG/1
3.12 TABLET ORAL 2 TIMES DAILY WITH MEALS
Status: DISCONTINUED | OUTPATIENT
Start: 2025-02-18 | End: 2025-02-19 | Stop reason: HOSPADM

## 2025-02-18 RX ORDER — INSULIN LISPRO 100 [IU]/ML
2-9 INJECTION, SOLUTION INTRAVENOUS; SUBCUTANEOUS
Status: DISCONTINUED | OUTPATIENT
Start: 2025-02-18 | End: 2025-02-19 | Stop reason: HOSPADM

## 2025-02-18 RX ORDER — LIDOCAINE HYDROCHLORIDE 10 MG/ML
0.5 INJECTION, SOLUTION INFILTRATION; PERINEURAL ONCE AS NEEDED
Status: DISCONTINUED | OUTPATIENT
Start: 2025-02-18 | End: 2025-02-18 | Stop reason: HOSPADM

## 2025-02-18 RX ORDER — CEFAZOLIN SODIUM 500 MG/2.2ML
INJECTION, POWDER, FOR SOLUTION INTRAMUSCULAR; INTRAVENOUS AS NEEDED
Status: DISCONTINUED | OUTPATIENT
Start: 2025-02-18 | End: 2025-02-18 | Stop reason: SURG

## 2025-02-18 RX ORDER — PROPOFOL 10 MG/ML
VIAL (ML) INTRAVENOUS AS NEEDED
Status: DISCONTINUED | OUTPATIENT
Start: 2025-02-18 | End: 2025-02-18 | Stop reason: SURG

## 2025-02-18 RX ORDER — ONDANSETRON 2 MG/ML
4 INJECTION INTRAMUSCULAR; INTRAVENOUS ONCE AS NEEDED
Status: DISCONTINUED | OUTPATIENT
Start: 2025-02-18 | End: 2025-02-19 | Stop reason: HOSPADM

## 2025-02-18 RX ORDER — ONDANSETRON 2 MG/ML
INJECTION INTRAMUSCULAR; INTRAVENOUS AS NEEDED
Status: DISCONTINUED | OUTPATIENT
Start: 2025-02-18 | End: 2025-02-18 | Stop reason: SURG

## 2025-02-18 RX ADMIN — ROCURONIUM BROMIDE 50 MG: 10 INJECTION, SOLUTION INTRAVENOUS at 13:27

## 2025-02-18 RX ADMIN — SODIUM CHLORIDE, POTASSIUM CHLORIDE, SODIUM LACTATE AND CALCIUM CHLORIDE 9 ML/HR: 600; 310; 30; 20 INJECTION, SOLUTION INTRAVENOUS at 12:29

## 2025-02-18 RX ADMIN — SODIUM CHLORIDE 2000 MG: 900 INJECTION INTRAVENOUS at 12:50

## 2025-02-18 RX ADMIN — ONDANSETRON 4 MG: 2 INJECTION INTRAMUSCULAR; INTRAVENOUS at 13:35

## 2025-02-18 RX ADMIN — CEFAZOLIN 1 G: 225 INJECTION, POWDER, FOR SOLUTION INTRAMUSCULAR; INTRAVENOUS at 15:45

## 2025-02-18 RX ADMIN — HEPARIN SODIUM 13000 UNITS: 1000 INJECTION INTRAVENOUS; SUBCUTANEOUS at 13:47

## 2025-02-18 RX ADMIN — FENTANYL CITRATE 25 MCG: 50 INJECTION, SOLUTION INTRAMUSCULAR; INTRAVENOUS at 19:00

## 2025-02-18 RX ADMIN — HEPARIN SODIUM 3000 UNITS: 1000 INJECTION INTRAVENOUS; SUBCUTANEOUS at 14:58

## 2025-02-18 RX ADMIN — HYDROMORPHONE HYDROCHLORIDE 0.5 MG: 1 INJECTION, SOLUTION INTRAMUSCULAR; INTRAVENOUS; SUBCUTANEOUS at 13:40

## 2025-02-18 RX ADMIN — PHENYLEPHRINE HYDROCHLORIDE 200 MCG: 10 INJECTION INTRAVENOUS at 16:45

## 2025-02-18 RX ADMIN — SUGAMMADEX 400 MG: 100 INJECTION, SOLUTION INTRAVENOUS at 17:01

## 2025-02-18 RX ADMIN — DEXAMETHASONE SODIUM PHOSPHATE 10 MG: 4 INJECTION, SOLUTION INTRA-ARTICULAR; INTRALESIONAL; INTRAMUSCULAR; INTRAVENOUS; SOFT TISSUE at 13:35

## 2025-02-18 RX ADMIN — FENTANYL CITRATE 25 MCG: 50 INJECTION, SOLUTION INTRAMUSCULAR; INTRAVENOUS at 19:06

## 2025-02-18 RX ADMIN — SODIUM CHLORIDE, POTASSIUM CHLORIDE, SODIUM LACTATE AND CALCIUM CHLORIDE 50 ML/HR: 600; 310; 30; 20 INJECTION, SOLUTION INTRAVENOUS at 23:57

## 2025-02-18 RX ADMIN — PROPOFOL 200 MG: 10 INJECTION, EMULSION INTRAVENOUS at 13:16

## 2025-02-18 RX ADMIN — HYDROMORPHONE HYDROCHLORIDE 0.25 MG: 1 INJECTION, SOLUTION INTRAMUSCULAR; INTRAVENOUS; SUBCUTANEOUS at 20:55

## 2025-02-18 RX ADMIN — HYDROMORPHONE HYDROCHLORIDE 0.25 MG: 1 INJECTION, SOLUTION INTRAMUSCULAR; INTRAVENOUS; SUBCUTANEOUS at 20:44

## 2025-02-18 RX ADMIN — INSULIN LISPRO 7 UNITS: 100 INJECTION, SOLUTION INTRAVENOUS; SUBCUTANEOUS at 23:07

## 2025-02-18 RX ADMIN — FAMOTIDINE 20 MG: 10 INJECTION INTRAVENOUS at 12:28

## 2025-02-18 RX ADMIN — MIDAZOLAM 0.5 MG: 1 INJECTION INTRAMUSCULAR; INTRAVENOUS at 12:44

## 2025-02-18 RX ADMIN — IPRATROPIUM BROMIDE AND ALBUTEROL SULFATE 3 ML: .5; 3 SOLUTION RESPIRATORY (INHALATION) at 12:33

## 2025-02-18 RX ADMIN — CARVEDILOL 3.12 MG: 3.12 TABLET, FILM COATED ORAL at 23:16

## 2025-02-18 RX ADMIN — IOPAMIDOL 124 ML: 510 INJECTION, SOLUTION INTRAVASCULAR at 17:14

## 2025-02-18 RX ADMIN — OXYCODONE HYDROCHLORIDE 5 MG: 5 TABLET ORAL at 22:37

## 2025-02-18 RX ADMIN — HEPARIN SODIUM 2000 UNITS: 1000 INJECTION INTRAVENOUS; SUBCUTANEOUS at 16:26

## 2025-02-18 RX ADMIN — ROCURONIUM BROMIDE 20 MG: 10 INJECTION, SOLUTION INTRAVENOUS at 15:56

## 2025-02-18 RX ADMIN — PROTAMINE SULFATE 40 MG: 10 INJECTION, SOLUTION INTRAVENOUS at 16:38

## 2025-02-18 RX ADMIN — CLOPIDOGREL BISULFATE 75 MG: 75 TABLET, FILM COATED ORAL at 18:13

## 2025-02-18 RX ADMIN — SUCCINYLCHOLINE CHLORIDE 180 MG: 20 INJECTION, SOLUTION INTRAMUSCULAR; INTRAVENOUS; PARENTERAL at 13:16

## 2025-02-18 RX ADMIN — INSULIN GLARGINE 85 UNITS: 100 INJECTION, SOLUTION SUBCUTANEOUS at 23:04

## 2025-02-18 RX ADMIN — LABETALOL HYDROCHLORIDE 5 MG: 5 INJECTION, SOLUTION INTRAVENOUS at 23:58

## 2025-02-18 NOTE — OP NOTE
Operative Note  Location: [facility]  Date of Admission:  2/18/2025  OR Date: 2/18/2025    Pre-op Diagnosis:  Peripheral arterial disease of the right and left lower extremities with disabling claudications.  COPD associated with ongoing cigarette smoking  Diabetes type 2  Obesity BMI 36.3    Post-op Diagnosis:  Same    Procedure:   Ultrasound-guided access to the right and left common femoral arteries with femoral arteriograms  Aortogram, bilateral iliac arteriograms.  Aortic stent graft angioplasty using 11 x 39 mm VBX stent graft with gentle post stent deployment balloon angioplasty to 20 mm  Aortoiliac stenting using a kilt technique and 8 x 79 and then 8 x 39 VBX stent grafts placed in the right and left common iliac arteries extending proximally into the aorta into the previously placed aortic stent graft  Right external iliac stent angioplasty using a 7 x 100 mm EV3 Protégé self-expanding stent, with post stent deployment balloon angioplasty to 6 mm.  Left external iliac stent angioplasty using two 7 x 60 EV3 Protégé self-expanding stents with post stent deployment balloon angioplasty to 8 and 6 mm.  Successful deployment of Perclose percutaneous closure devices right and left groins  Right femoral endarterectomy with patch    Surgeon: Bruce Burkett MD    Assistant: Yusra Oates RN, Provided critical assistance in exposure, retraction, and suction that overall decrease blood loss and operative time.    Anesthesia: General    Staff:   Circulator: Matilda Weinstein RN; Matilda Villa RN; Stephani Tidwell RN  Scrub Person: Lydia Bautista  Vascular Radiology Technician: Kassie Hatch Joseph  Assistant: Yusra Oates CSFA  was responsible for performing the following activities: Retraction, Suction, Irrigation, Closing, Placing Dressing, and assistance with needle, wires, sheaths, catheters, balloons and stents  and her skilled assistance was necessary for the success of this  case.    Estimated Blood Loss: 100 mL    Specimen: None    Complications:  Occlusion of distal right common femoral artery associated with stent, identified intraoperatively, treated with endarterectomy and patch.    Findings: Initial right femoral arteriogram demonstrated successful CFA cannulation, and normal-appearing CFA and SFA/profunda.  Distal external iliac artery occlusion.  Initial left femoral arteriogram demonstrating successful CFA cannulation, normal-appearing arteries.  Aorta patent with patent renal arteries and smooth tapered stenosis of the mid to distal infrarenal aorta.  Large and patent SHELBY.  Right and left common iliac artery occlusion.  Poor demonstration of internal iliac arteries and external iliac arteries prior to intervention.  Successful stent angioplasty of aortic stenosis with 11 x 39 mm stent graft.  Successful recanalization of chronic total occlusion of right external iliac artery and of left common iliac artery.  Successful stent graft angioplasty of right and left common iliac arteries with secondary proximal extension of iliac stent grafts into the aortic stent graft with preservation of SHELBY.  Successful right and left external iliac stent  angioplasties.  Occlusive plaque distal right common femoral artery associated with sheath.  Successful deployment of closure devices right and left groins.  Femoral artery explored, with coral reef type plaque in common femoral artery associated with sheath, causing occlusion of the distal common femoral and superficial femoral arteries, treated successfully with endarterectomy and patch closure.  Excellent signals in pedal arteries following procedure.    Implants:   Implant Name Type Inv. Item Serial No.  Lot No. LRB No. Used Action   STENTGR ENDOPROSTH VIABAHN VBX EXP 8F [id number] 135CM - [id number] - [id number] Implant STENTGR ENDOPROSTH VIABAHN VBX EXP 8F [id number] 135CM [id number] [employer] NR N/A 1 Implanted    STENTGR ENDOPROSTH VIABAHN VBX BALN/EXP HEP 8F 8X79MM 135CM - [id number] - [id number] Implant STENTGR ENDOPROSTH VIABAHN VBX BALN/EXP HEP 8F 8X79MM 135CM [id number] [employer] . N/A 1 Implanted   STENTGR ENDOPROSTH VIABAHN VBX BALN/EXP HEP 8F 8X79MM 135CM - [id number] - [id number] Implant STENTGR ENDOPROSTH VIABAHN VBX BALN/EXP HEP 8F 8X79MM 135CM [id number] [employer] . N/A 1 Implanted   STNT PROTEGE EVERFLX SEXP .035 8X60 80 - [id number] Stent STNT PROTEGE EVERFLX SEXP .035 8X60 80  EV[employer] [id number] Left 1 Implanted   STNT PROTEGE EVERFLX SEXP.035 7X60 80 - [id number] Stent STNT PROTEGE EVERFLX SEXP.035 7X60 80  [employer] [id number] Left 1 Implanted   STENTGR ENDOPROSTH VIABAHN VBX EXP 7F [id number] 135CM - [id number] - [id number] Implant STENTGR ENDOPROSTH VIABAHN VBX EXP 7F [id number] 135CM [id number] [employer] . Left 1 Implanted   STENTGR ENDOPROSTH VIABAHN VBX EXP 7F [id number] 135CM - [id number] - [id number] Implant STENTGR ENDOPROSTH VIABAHN VBX EXP 7F [id number] 135CM [id number] [employer] . Right 1 Implanted   STNT PROTEGE EVERFLX SEXP.035 7X100 120 - [id number] Stent STNT PROTEGE EVERFLX SEXP.035 7X100 120  [employer] [id number] Right 1 Implanted   KT SEAL HEMOS ABS FLOSEAL MATRX 1.5/FAST/PREP 5000/IU 10ML - [id number] Implant KT SEAL HEMOS ABS FLOSEAL MATRX 1.5/FAST/PREP 5000/IU 10ML  [facility] [id number] Right 1 Implanted   CLIPAPPLR M/ ENDO LIGACLIP9 3/8IN MD - [id number] Implant CLIPAPPLR M/ ENDO LIGACLIP9 3/8IN MD  [employer]  DIV OF J AND J [id number] Right 1 Implanted   CLIPAPPLR M/ ENDO LIGACLIP 9 3/8IN SM - [id number] Implant CLIPAPPLR M/ ENDO LIGACLIP 9 3/8IN   [employer]  DIV OF J AND J [id number] Right 1 Implanted   PTCH VASC VASCUGUARD TPR/END 0.8X8CM STRL - [id number] Implant PTCH VASC VASCUGUARD TPR/END 0.8X8CM STRL  [facility] [id number] Right 1 Implanted     Indications: 69-year-old infirm, heavy smoking, type II diabetic  with marked insulin resistance presented to office with disabling claudication and probably rest pain of the right and left feet.  Symptoms refractory to cilostazol.  CT angiogram demonstrated smooth, tapered infrarenal aortic stenosis, left common iliac occlusion and significant disease in the right and left external iliac arteries, with reasonable infrainguinal runoff.  Ideally, I think the pattern of blockage would be best treated with aortobifemoral bypass, but the patient did not strike me as being a suitable candidate for surgery.  As an alternative, I feel he is a candidate for attempted percutaneous revascularization and submits now for operation.       Procedure:  Patient was given a mini neb treatment in the preoperative holding area.  A radial arterial line was placed.  He was given Kefzol IV.  He was transferred to the operating room and positioned supine the operating table.  After the induction of general endotracheal anesthesia, the right and left groins were shaven, washed with Hibiclens, draped with sterile towels.  Under ultrasound guidance and using a micropuncture kit, micropuncture access was obtained to the right common femoral artery.  Femoral arteriogram demonstrated normal appearance of the common, and proximal superficial and deep femoral arteries, and suitable anatomy for closure device utilization.  There was probable external iliac artery occlusion.  An angled Glidewire was passed and an 8 [place] citizen sheath was placed.  Under ultrasound guidance and using a micropuncture kit, micropuncture access was similarly obtained to the left common femoral artery.  A femoral arteriogram demonstrated normal anatomy of the common, superficial and deep femoral arteries, suitable for closure device utilization.  Glidewire passed and 8 [place] citizen sheath placed.  Patient was heparinized.  ACT was checked and additional heparin given at intervals to maintain therapeutic level of anticoagulation.  Omni Flush  catheter passed from right groin and into aorta.  Flush aortogram obtained with catheter and perirenal aorta and in distal aorta to define anatomy.  I felt that the aortic stenosis was more significant than I had originally thought and elected to treat.  There was suitable anatomy for deployment of an 11 x 39 mm Viabahn balloon expandable stent with a plan to for post stent deployment balloon angioplasty to 16 mm.  However I did not have a 16 or even an 18 mm balloon, but only a 20 mL balloon.  The balloon required a 9 Kyr[place]z sheath, so I had to upsized to a 9 K[place]z sheath on the right, and there was some difficulty passing the larger sheath.  I preclosed the puncture site with a Perclose device for eventual use.  The stent graft was deployed and the balloon was inflated to 4 bindu.  There was incomplete expansion of the balloon but I accepted the result.  Follow-up study demonstrated patency of the aorta, improved luminal purchase, with dissection of the distal aorta.  I then used a Navicross catheter with an angled Glidewire and obtained successful guidewire passage of the left sided wire into the aorta.  I did not have an appreciation for where specifically the wire entered the true lumen of the aorta, but the wire was in the main body of the aorta including the previously placed aortic stent.  I confirmed successful reentry into the true aortic lumen with a spin test of the Omni Flush catheter inside the aortic stent, successful.  I placed 8 x 79 mm common iliac artery stent grafts bilaterally.  There was residual stenosis in the left external iliac artery that I felt would be treatable with a single self-expanding stent and so placed an 8 x 60 mm self-expanding Protégé stent with post stent deployment angioplasty to 8 mm.  I then performed an aortogram with the Omni Flush catheter, which demonstrated poor opacification of the left common iliac artery stent I was not sure if this was an inflow or an outflow  problem so I decided to perform bilateral sheath injections, and identified a stenosis in the distal left external iliac artery, treated with the second EV 3 stent this time 7 x 60 mm, treated with post stent deployment balloon angioplasty to 6 mm with good result.  Sheath injection demonstrated an ideal result but I performed an aortogram with the Omni Flush catheter, again demonstrating poor opacification of the left iliac stent system.  I concluded that the problem related to the unstented distal aorta, and so I placed two 8 x 39 mm VBX stents in the distal aorta extending from within the aortic stent graft and then into the common iliac artery stents using a Celt technique.  A repeat arteriogram demonstrated excellent opacification of the left iliac system.  There was residual right external iliac stenosis which I elected to treat with a 7 x 100 mm Protégé Everflex stent with post stent deployment balloon angioplasty to 6 mm.  There are images missing from the procedure which did not make it onto the PACS system.  The completion she shot on the right groin demonstrated excellent appearance of the external iliac artery, but there was occlusive plaque in the distal common femoral artery associated with the sheath that appeared occlusive.  The superficial femoral artery did not opacify.  I felt that this was a problem and would need to be rectified.  A Perclose device was deployed successfully on the left groin, hemostatic.  The previously placed right groin Perclose device was deployed, hemostatic.  The patient was noted to have excellent signal in the left pedal artery.  He had better signals on the right than he had before surgery, but I did not  this was satisfactory.  A sterile dressing was applied to the left groin.  The dressings were taken down, and I washed the right and left groins with Hibiclens again.  A Callaway catheter was placed.  The right and left groins were prepared with ChloraPrep and draped  in a sterile fashion.  We gave an extra gram of cefazolin, and we made a plan regarding the status of the ACT and heparin dosing.  An oblique right groin incision was made and carried through the subcutaneous tissue.  I obtained control of the common, superficial and deep femoral arteries.  There was calcified plaque palpated within the distal common femoral artery.  The common, superficial and deep femoral arteries were clamped.  A longitudinal common femoral arteriotomy was made and extended onto the proximal superficial femoral artery.  There was a large coral reef plaque right in the middle of the distal common femoral artery, associated with the puncture site.  There was also disruption of plaque in the proximal superficial femoral artery that must of come up because of traction.  I performed endarterectomy of the artery including of the more proximal common femoral artery.  Remaining debris was carefully removed.  A single tacking suture was placed in the proximal SFA.  The arteriotomy was closed with a bovine pericardial patch.  Flushing maneuvers were performed before restoring antegrade flow first to the profunda and then to the superficial femoral artery.  There was an excellent common femoral artery pulse, with excellent signals in the profunda and superficial femoral arteries.  Protamine 40 mg was administered.  Floseal was applied to the groin incision, now hemostatic.  The wound was closed in layers with Vicryl sutures.  Dermal adhesive was applied.  At its conclusion the patient had tolerated the procedure well, and without apparent complications.  Sponge and needle counts were correct.  The patient was taken to the recovery area in stable condition.    Burce Burkett MD     2/18/2025, 17:45 EST

## 2025-02-18 NOTE — ANESTHESIA POSTPROCEDURE EVALUATION
Patient: Justice Aldridge    Procedure Summary       Date: 02/18/25 Room / Location[facility]    Anesthesia Start: 1300 Anesthesia Stop: 1730    Procedure: AORTOGRAM, AORTIC AND BILATERAL ILIAC ARTERY ANGIOPLASTY WITH STENT, RIGHT FEMORAL ENDARTERECTOMY (Bilateral: Thigh) Diagnosis:       Atherosclerosis of native arteries of extremities with intermittent claudication, bilateral legs      (Atherosclerosis of native arteries of extremities with intermittent claudication, bilateral legs [I70.213])    Surgeons: Bruce Burkett MD Provider: Pop Perez MD    Anesthesia Type: general ASA Status: 4            Anesthesia Type: general    Vitals  Vitals Value Taken Time   /81 02/18/25 1830   Temp 37 °C (98.6 °F) 02/18/25 1725   Pulse 91 02/18/25 1832   Resp 16 02/18/25 1830   SpO2 92 % 02/18/25 1832   Vitals shown include unfiled device data.        Post Anesthesia Care and Evaluation    Patient location during evaluation: PACU  Patient participation: complete - patient participated  Level of consciousness: awake and alert  Pain management: adequate    Airway patency: patent  Anesthetic complications: No anesthetic complications    Cardiovascular status: acceptable  Respiratory status: acceptable  Hydration status: acceptable    Comments: /81   Pulse 92   Temp 37 °C (98.6 °F) (Oral)   Resp 16   SpO2 92%

## 2025-02-18 NOTE — PERIOPERATIVE NURSING NOTE
Pt's sister reports pt has 18 steps to go up and is concerned about getting pt in the house after surgery and request pt stay overnight if possible.

## 2025-02-18 NOTE — ANESTHESIA PROCEDURE NOTES
Airway  Urgency: elective    Date/Time: 2/18/2025 1:18 PM  Airway not difficult    General Information and Staff    Patient location during procedure: OR  CRNA/CAA: Mel Grijalva CRNA    Indications and Patient Condition  Indications for airway management: airway protection    Preoxygenated: yes  MILS maintained throughout  Mask difficulty assessment: 0 - not attempted    Final Airway Details  Final airway type: endotracheal airway      Successful airway: ETT  Cuffed: yes   Successful intubation technique: RSI and video laryngoscopy  Endotracheal tube insertion site: oral  Blade: CMAC  Blade size: D  ETT size (mm): 7.5  Cormack-Lehane Classification: grade I - full view of glottis  Placement verified by: chest auscultation and capnometry   Measured from: lips  ETT/EBT  to lips (cm): 21  Number of attempts at approach: 1  Assessment: lips, teeth, and gum same as pre-op and atraumatic intubation    Additional Comments  Atraumatic insertion

## 2025-02-18 NOTE — ANESTHESIA PREPROCEDURE EVALUATION
Anesthesia Evaluation     Patient summary reviewed and Nursing notes reviewed   NPO Solid Status: > 8 hours  NPO Liquid Status: > 2 hours           Airway   Mallampati: III  TM distance: <3 FB  Neck ROM: limited  Difficult intubation highly probable, Large neck circumference and Anterior  Dental      Pulmonary    (+) a smoker Current,  Cardiovascular     Patient on routine beta blocker and Beta blocker given within 24 hours of surgery    (+) hypertension, CAD, dysrhythmias (RBBB, LPFB) PAC, PVD, hyperlipidemia,  carotid artery disease right carotid      Neuro/Psych  (+) numbness    ROS Comment: Cerebrovascular disease    GI/Hepatic/Renal/Endo    (+) obesity, renal disease- CRI and stones, diabetes mellitus type 2 using insulin    Musculoskeletal     Abdominal   (+) obese   Substance History      OB/GYN          Other            Phys Exam Other: Grade III view with mac blade, D blade used ultimately  LMA 5 also used in the past successfully              Anesthesia Plan    ASA 4     general     (I have reviewed the patient's history and chart with the patient, including all pertinent laboratory results and imaging. I have explained the risks of anesthesia including but not limited to dental damage, corneal abrasion, nerve injury, MI, stroke, aspiration, and death. Patient has agreed to proceed.      /83 (BP Location: Right arm, Patient Position: Lying)   Pulse 88   Temp 36.6 °C (97.9 °F) (Oral)   Resp 16   SpO2 94%   )  intravenous induction     Anesthetic plan, risks, benefits, and alternatives have been provided, discussed and informed consent has been obtained with: patient.    CODE STATUS:

## 2025-02-18 NOTE — ANESTHESIA PROCEDURE NOTES
Arterial Line      Patient reassessed immediately prior to procedure    Patient location during procedure: pre-op  Start time: 2/18/2025 12:46 PM  Stop Time:2/18/2025 12:48 PM       Line placed for respiratory failure, hemodynamic monitoring, MD/Surgeon request and ABGs/Labs/ISTAT.  Performed By   Anesthesiologist: Jahaira Pedro MD   Preanesthetic Checklist  Completed: patient identified, IV checked, site marked, risks and benefits discussed, surgical consent, monitors and equipment checked, pre-op evaluation and timeout performed  Arterial Line Prep    Sterile Tech: cap, gloves, sterile barriers and mask  Prep: ChloraPrep  Patient monitoring: EKG, continuous pulse oximetry and blood pressure monitoring  Arterial Line Procedure   Laterality:left  Location:  radial artery  Catheter size: 20 G   Guidance: ultrasound guided  PROCEDURE NOTE/ULTRASOUND INTERPRETATION.  Using ultrasound guidance the potential vascular sites for insertion of the catheter were visualized to determine the patency of the vessel to be used for vascular access.  After selecting the appropriate site for insertion, the needle was visualized under ultrasound being inserted into the radial artery, followed by ultrasound confirmation of wire and catheter placement. There were no abnormalities seen on ultrasound; an image was taken; and the patient tolerated the procedure with no complications.   Number of attempts: 1  Successful placement: yes Images: still images obtained, printed/placed on the chart  Post Assessment   Dressing Type: wrist guard applied, secured with tape and occlusive dressing applied.   Complications no  Circ/Move/Sens Assessment: normal and unchanged.   Patient Tolerance: patient tolerated the procedure well with no apparent complications  Additional Notes  Using ultrasound guidance the potential vascular sites for insertion of the catheter were visualized to determine the patency of the vessel to be used for vascular  access.  After selecting the appropriate site for insertion, the needle was visualized under ultrasound being inserted into the artery, followed by ultrasound confirmation of wire and catheter placement.  There were no abnormalities seen on ultrasound; an image was taken/ and the patient tolerated the procedure with no complications.

## 2025-02-19 ENCOUNTER — READMISSION MANAGEMENT (OUTPATIENT)
Dept: CALL CENTER | Facility: HOSPITAL | Age: 70
End: 2025-02-19
Payer: MEDICARE

## 2025-02-19 LAB
ACT BLD: 124 SECONDS (ref 82–152)
ACT BLD: 233 SECONDS (ref 82–152)
ACT BLD: 262 SECONDS (ref 82–152)
ACT BLD: 262 SECONDS (ref 82–152)
ACT BLD: <50 SECONDS (ref 82–152)
ANION GAP SERPL CALCULATED.3IONS-SCNC: 10 MMOL/L (ref 5–15)
BASOPHILS # BLD AUTO: 0.04 10*3/MM3 (ref 0–0.2)
BASOPHILS NFR BLD AUTO: 0.2 % (ref 0–1.5)
BUN SERPL-MCNC: 26 MG/DL (ref 8–23)
BUN/CREAT SERPL: 17.1 (ref 7–25)
CALCIUM SPEC-SCNC: 8.9 MG/DL (ref 8.6–10.5)
CHLORIDE SERPL-SCNC: 99 MMOL/L (ref 98–107)
CO2 SERPL-SCNC: 23 MMOL/L (ref 22–29)
CREAT SERPL-MCNC: 1.52 MG/DL (ref 0.76–1.27)
DEPRECATED RDW RBC AUTO: 43 FL (ref 37–54)
EGFRCR SERPLBLD CKD-EPI 2021: 49.6 ML/MIN/1.73
EOSINOPHIL # BLD AUTO: 0 10*3/MM3 (ref 0–0.4)
EOSINOPHIL NFR BLD AUTO: 0 % (ref 0.3–6.2)
ERYTHROCYTE [DISTWIDTH] IN BLOOD BY AUTOMATED COUNT: 12.5 % (ref 12.3–15.4)
GLUCOSE BLDC GLUCOMTR-MCNC: 280 MG/DL (ref 70–130)
GLUCOSE BLDC GLUCOMTR-MCNC: 368 MG/DL (ref 70–130)
GLUCOSE SERPL-MCNC: 399 MG/DL (ref 65–99)
HBA1C MFR BLD: 8.1 % (ref 4.8–5.6)
HCT VFR BLD AUTO: 50.8 % (ref 37.5–51)
HGB BLD-MCNC: 16.3 G/DL (ref 13–17.7)
IMM GRANULOCYTES # BLD AUTO: 0.17 10*3/MM3 (ref 0–0.05)
IMM GRANULOCYTES NFR BLD AUTO: 0.9 % (ref 0–0.5)
LYMPHOCYTES # BLD AUTO: 1.28 10*3/MM3 (ref 0.7–3.1)
LYMPHOCYTES NFR BLD AUTO: 6.6 % (ref 19.6–45.3)
MCH RBC QN AUTO: 30 PG (ref 26.6–33)
MCHC RBC AUTO-ENTMCNC: 32.1 G/DL (ref 31.5–35.7)
MCV RBC AUTO: 93.4 FL (ref 79–97)
MONOCYTES # BLD AUTO: 1.01 10*3/MM3 (ref 0.1–0.9)
MONOCYTES NFR BLD AUTO: 5.2 % (ref 5–12)
NEUTROPHILS NFR BLD AUTO: 16.81 10*3/MM3 (ref 1.7–7)
NEUTROPHILS NFR BLD AUTO: 87.1 % (ref 42.7–76)
NRBC BLD AUTO-RTO: 0 /100 WBC (ref 0–0.2)
PLATELET # BLD AUTO: 170 10*3/MM3 (ref 140–450)
PMV BLD AUTO: 9.8 FL (ref 6–12)
POTASSIUM SERPL-SCNC: 4.8 MMOL/L (ref 3.5–5.2)
RBC # BLD AUTO: 5.44 10*6/MM3 (ref 4.14–5.8)
SODIUM SERPL-SCNC: 132 MMOL/L (ref 136–145)
WBC NRBC COR # BLD AUTO: 19.31 10*3/MM3 (ref 3.4–10.8)

## 2025-02-19 PROCEDURE — 63710000001 INSULIN LISPRO (HUMAN) PER 5 UNITS: Performed by: SURGERY

## 2025-02-19 PROCEDURE — 25010000002 CEFAZOLIN PER 500 MG: Performed by: SURGERY

## 2025-02-19 PROCEDURE — 97116 GAIT TRAINING THERAPY: CPT

## 2025-02-19 PROCEDURE — 99024 POSTOP FOLLOW-UP VISIT: CPT | Performed by: SURGERY

## 2025-02-19 PROCEDURE — 82948 REAGENT STRIP/BLOOD GLUCOSE: CPT

## 2025-02-19 PROCEDURE — 80048 BASIC METABOLIC PNL TOTAL CA: CPT | Performed by: SURGERY

## 2025-02-19 PROCEDURE — 63710000001 INSULIN GLARGINE PER 5 UNITS: Performed by: SURGERY

## 2025-02-19 PROCEDURE — 97162 PT EVAL MOD COMPLEX 30 MIN: CPT

## 2025-02-19 PROCEDURE — 85025 COMPLETE CBC W/AUTO DIFF WBC: CPT | Performed by: SURGERY

## 2025-02-19 PROCEDURE — 25010000002 HEPARIN (PORCINE) PER 1000 UNITS: Performed by: SURGERY

## 2025-02-19 PROCEDURE — 83036 HEMOGLOBIN GLYCOSYLATED A1C: CPT | Performed by: SURGERY

## 2025-02-19 RX ORDER — CLOPIDOGREL BISULFATE 75 MG/1
75 TABLET ORAL DAILY
Qty: 30 TABLET | Refills: 0 | Status: SHIPPED | OUTPATIENT
Start: 2025-02-20

## 2025-02-19 RX ORDER — NITROGLYCERIN 0.4 MG/1
0.4 TABLET SUBLINGUAL
Status: DISCONTINUED | OUTPATIENT
Start: 2025-02-19 | End: 2025-02-19 | Stop reason: HOSPADM

## 2025-02-19 RX ORDER — IBUPROFEN 600 MG/1
1 TABLET ORAL
Status: DISCONTINUED | OUTPATIENT
Start: 2025-02-19 | End: 2025-02-19 | Stop reason: HOSPADM

## 2025-02-19 RX ORDER — DEXTROSE MONOHYDRATE 25 G/50ML
25 INJECTION, SOLUTION INTRAVENOUS
Status: DISCONTINUED | OUTPATIENT
Start: 2025-02-19 | End: 2025-02-19 | Stop reason: HOSPADM

## 2025-02-19 RX ORDER — ACETAMINOPHEN 160 MG/5ML
650 SOLUTION ORAL EVERY 8 HOURS
Status: DISCONTINUED | OUTPATIENT
Start: 2025-02-19 | End: 2025-02-19 | Stop reason: HOSPADM

## 2025-02-19 RX ORDER — ALBUTEROL SULFATE 0.63 MG/3ML
0.63 SOLUTION RESPIRATORY (INHALATION) EVERY 6 HOURS PRN
Status: DISCONTINUED | OUTPATIENT
Start: 2025-02-19 | End: 2025-02-19 | Stop reason: HOSPADM

## 2025-02-19 RX ORDER — NICOTINE POLACRILEX 4 MG
15 LOZENGE BUCCAL
Status: DISCONTINUED | OUTPATIENT
Start: 2025-02-19 | End: 2025-02-19 | Stop reason: HOSPADM

## 2025-02-19 RX ORDER — PRAVASTATIN SODIUM 40 MG
40 TABLET ORAL DAILY
Status: DISCONTINUED | OUTPATIENT
Start: 2025-02-19 | End: 2025-02-19 | Stop reason: HOSPADM

## 2025-02-19 RX ORDER — GLIPIZIDE 5 MG/1
10 TABLET ORAL
Status: DISCONTINUED | OUTPATIENT
Start: 2025-02-19 | End: 2025-02-19 | Stop reason: HOSPADM

## 2025-02-19 RX ORDER — ACETAMINOPHEN 325 MG/1
650 TABLET ORAL EVERY 8 HOURS
Status: DISCONTINUED | OUTPATIENT
Start: 2025-02-19 | End: 2025-02-19 | Stop reason: HOSPADM

## 2025-02-19 RX ORDER — OXYCODONE HYDROCHLORIDE 5 MG/1
5 TABLET ORAL EVERY 6 HOURS PRN
Qty: 10 TABLET | Refills: 0 | Status: SHIPPED | OUTPATIENT
Start: 2025-02-19 | End: 2025-02-23

## 2025-02-19 RX ORDER — ACETAMINOPHEN 650 MG/1
650 SUPPOSITORY RECTAL EVERY 8 HOURS
Status: DISCONTINUED | OUTPATIENT
Start: 2025-02-19 | End: 2025-02-19 | Stop reason: HOSPADM

## 2025-02-19 RX ORDER — ONDANSETRON 4 MG/1
4 TABLET, ORALLY DISINTEGRATING ORAL EVERY 6 HOURS PRN
Status: DISCONTINUED | OUTPATIENT
Start: 2025-02-19 | End: 2025-02-19 | Stop reason: HOSPADM

## 2025-02-19 RX ORDER — ONDANSETRON 2 MG/ML
4 INJECTION INTRAMUSCULAR; INTRAVENOUS EVERY 6 HOURS PRN
Status: DISCONTINUED | OUTPATIENT
Start: 2025-02-19 | End: 2025-02-19 | Stop reason: HOSPADM

## 2025-02-19 RX ORDER — HEPARIN SODIUM 5000 [USP'U]/ML
5000 INJECTION, SOLUTION INTRAVENOUS; SUBCUTANEOUS EVERY 8 HOURS SCHEDULED
Status: DISCONTINUED | OUTPATIENT
Start: 2025-02-19 | End: 2025-02-19 | Stop reason: HOSPADM

## 2025-02-19 RX ORDER — TIMOLOL MALEATE 5 MG/ML
1 SOLUTION/ DROPS OPHTHALMIC EVERY 12 HOURS SCHEDULED
Status: DISCONTINUED | OUTPATIENT
Start: 2025-02-19 | End: 2025-02-19 | Stop reason: HOSPADM

## 2025-02-19 RX ADMIN — CLOPIDOGREL BISULFATE 75 MG: 75 TABLET, FILM COATED ORAL at 08:12

## 2025-02-19 RX ADMIN — CEFAZOLIN 2000 MG: 2 INJECTION, POWDER, FOR SOLUTION INTRAMUSCULAR; INTRAVENOUS at 00:01

## 2025-02-19 RX ADMIN — METFORMIN HYDROCHLORIDE 1000 MG: 1000 TABLET ORAL at 08:13

## 2025-02-19 RX ADMIN — HEPARIN SODIUM 5000 UNITS: 5000 INJECTION INTRAVENOUS; SUBCUTANEOUS at 05:47

## 2025-02-19 RX ADMIN — PRAVASTATIN SODIUM 40 MG: 40 TABLET ORAL at 08:12

## 2025-02-19 RX ADMIN — ACETAMINOPHEN 650 MG: 325 TABLET, FILM COATED ORAL at 08:14

## 2025-02-19 RX ADMIN — GLIPIZIDE 10 MG: 5 TABLET ORAL at 08:13

## 2025-02-19 RX ADMIN — GABAPENTIN 600 MG: 300 CAPSULE ORAL at 08:12

## 2025-02-19 RX ADMIN — CARVEDILOL 3.12 MG: 3.12 TABLET, FILM COATED ORAL at 08:14

## 2025-02-19 RX ADMIN — ASPIRIN 81 MG CHEWABLE TABLET 81 MG: 81 TABLET CHEWABLE at 08:12

## 2025-02-19 RX ADMIN — INSULIN GLARGINE 80 UNITS: 100 INJECTION, SOLUTION SUBCUTANEOUS at 08:13

## 2025-02-19 RX ADMIN — CEFAZOLIN 2000 MG: 2 INJECTION, POWDER, FOR SOLUTION INTRAMUSCULAR; INTRAVENOUS at 05:52

## 2025-02-19 NOTE — PERIOPERATIVE NURSING NOTE
In reviewing patient's post op orders, he has Lantus 85 units subcutaneous nightly ordered to start 2/19/2025 at 21:00. Current bedside glucose is 326.    Patient  reports he takes this  basal Insulinevery night and is concerned that his glucose will elevate even more.     Will contact attending physician to address

## 2025-02-19 NOTE — THERAPY EVALUATION
Patient Name: Justice Aldridge  : 1955    MRN: [id number]                              Today's Date: 2025       Admit Date: 2025    Visit Dx:     ICD-10-CM ICD-9-CM   1. Atherosclerosis of native arteries of extremities with intermittent claudication, bilateral legs  I70.213 440.21     Patient Active Problem List   Diagnosis    Asymptomatic stenosis of right carotid artery without infarction    Chronic renal insufficiency, stage II (mild)    Type 2 diabetes mellitus with diabetic neuropathic arthropathy    Hypertension associated with chronic kidney disease due to type 2 diabetes mellitus    Hyperlipidemia    Atherosclerosis with claudication of extremity    Coronary artery disease    Left ureteral calculus    Right epididymitis    Hydroureteronephrosis    Chronic kidney failure, stage 3 (moderate)    Essential hypertension    Peripheral vascular disease    Type 2 diabetes mellitus    GERD with esophagitis    Cerebrovascular disease    Nephrolithiasis    Atherosclerosis of native arteries of extremities with intermittent claudication, bilateral legs    Atherosclerosis of aorta     Past Medical History:   Diagnosis Date    Atherosclerosis of native arteries of extremities with intermittent claudication, bilateral legs 2019    Carotid artery stenosis 2019    mike    Chronic kidney disease, stage 2 (mild) 2022    Diabetes mellitus     Hard to intubate     Hyperlipidemia     Hypertension     Kidney stones     Lymphedema, not elsewhere classified 2022    Neuropathy     BOTH FEET    Other specified soft tissue disorders 2022    Peripheral vascular disease     Psoriasis     ELBOWS MIKE    Smoker     Type 2 diabetes mellitus with diabetic chronic kidney disease 2022     Past Surgical History:   Procedure Laterality Date    APPENDECTOMY      CAROTID ENDARTERECTOMY Right 2016    Right Carotid Endarterectomy    COLONOSCOPY  2019    CYSTOSCOPY W/ URETERAL STENT  PLACEMENT Left 02/15/2022    Procedure: LT. CYSTOSCOPY URETERAL CATHETER/STENT INSERTION;  Surgeon: Jere Nielsen Jr., MD;  Location: [facility] MAIN OR;  Service: Urology;  Laterality: Left;    FINGER SURGERY Left     KNEE ARTHROSCOPY      ligament repaired.    KNEE SURGERY Left     OTHER SURGICAL HISTORY      mouth/ tooth    NM TEAEC W/PATCH GRF CAROTID VERTB SUBCLAV NECK INC Right 11/16/2016    Procedure: RIGHT CAROTID ENDARTERECTOMY;  Surgeon: Roger Michael MD;  Location: [facility] OR;  Service: Vascular      General Information       [facility] Name 02/19/25 1020          Physical Therapy Time and Intention    Document Type evaluation  -     Mode of Treatment individual therapy;physical therapy  -       Row Name 02/19/25 1020          General Information    Patient Profile Reviewed yes  -     Prior Level of Function independent:;gait;transfer;bed mobility  -     Existing Precautions/Restrictions no known precautions/restrictions  -     Barriers to Rehab none identified  -       Row Name 02/19/25 1020          Living Environment    People in Home alone  -[facility] Name 02/19/25 1020          Stairs Within Home, Primary    Stairs, Within Home, Primary 16-18 LEIDY 2nd floor condo  -[facility] Name 02/19/25 1020          Cognition    Orientation Status (Cognition) oriented x 4  -       Row Name 02/19/25 1020          Safety Issues/Impairments Affecting Functional Mobility    Impairments Affecting Function (Mobility) balance;endurance/activity tolerance;strength;pain  -               User Key  (r) = Recorded By, (t) = Taken By, (c) = Cosigned By      Initials Name Provider Type     Aida Cummins PT Physical Therapist                   Mobility       Row Name 02/19/25 1020          Bed Mobility    Bed Mobility supine-sit  -     Supine-Sit [place] (Bed Mobility) supervision  -     Assistive Device (Bed Mobility) head of bed elevated;bed rails  -       Row Name 02/19/25 1020           Sit-Stand Transfer    Sit-Stand [place] (Transfers) standby assist  -     Assistive Device (Sit-Stand Transfers) other (see comments)  No AD  -[facility] Name 02/19/25 1020          Gait/Stairs (Locomotion)    [place] Level (Gait) standby assist;supervision  -     Assistive Device (Gait) other (see comments)  No AD  -     Distance in Feet (Gait) 90  -     Deviations/Abnormal Patterns (Gait) antalgic;rowena decreased;gait speed decreased;stride length decreased  -     Bilateral Gait Deviations forward flexed posture  -     Right Sided Gait Deviations weight shift ability decreased  -               User Key  (r) = Recorded By, (t) = Taken By, (c) = Cosigned By      Initials Name Provider Type     Aida Cummins, PT Physical Therapist                   Obj/Interventions       [facility] Name 02/19/25 1021          Range of Motion Comprehensive    General Range of Motion bilateral lower extremity ROM WFL  -BH       Row Name 02/19/25 1021          Strength Comprehensive (MMT)    General Manual Muscle Testing (MMT) Assessment lower extremity strength deficits identified  -     Comment, General Manual Muscle Testing (MMT) Assessment Generalized weakness, BLE grossly 4/5  -[facility] Name 02/19/25 1021          Balance    Balance Assessment sitting static balance;sitting dynamic balance;standing static balance;standing dynamic balance  -     Static Sitting Balance supervision  -     Dynamic Sitting Balance supervision  -     Position, Sitting Balance unsupported;sitting edge of bed  -     Static Standing Balance standby assist;verbal cues  -     Dynamic Standing Balance standby assist;verbal cues  -     Position/Device Used, Standing Balance unsupported  -     Balance Interventions sitting;standing;sit to stand;static;dynamic  -[facility] Name 02/19/25 1021          Sensory Assessment (Somatosensory)    Sensory Assessment (Somatosensory) LE sensation intact  -                User Key  (r) = Recorded By, (t) = Taken By, (c) = Cosigned By      Initials Name Provider Type     Aida Cummins PT Physical Therapist                   Goals/Plan       Row Name 02/19/25 1028          Bed Mobility Goal 1 (PT)    Activity/Assistive Device (Bed Mobility Goal 1, PT) bed mobility activities, all  -     [place] Level/Cues Needed (Bed Mobility Goal 1, PT) independent  -     Time Frame (Bed Mobility Goal 1, PT) 1 week  -[facility] Name 02/19/25 1028          Transfer Goal 1 (PT)    Activity/Assistive Device (Transfer Goal 1, PT) transfers, all  -     [place] Level/Cues Needed (Transfer Goal 1, PT) independent  -BH     Time Frame (Transfer Goal 1, PT) 1 week  -[facility] Name 02/19/25 1028          Gait Training Goal 1 (PT)    Activity/Assistive Device (Gait Training Goal 1, PT) gait (walking locomotion)  -     [place] Level (Gait Training Goal 1, PT) independent  -     Distance (Gait Training Goal 1, PT) 150ft  -     Time Frame (Gait Training Goal 1, PT) 1 week  -[facility] Name 02/19/25 1028          Therapy Assessment/Plan (PT)    Planned Therapy Interventions (PT) balance training;bed mobility training;gait training;home exercise program;patient/family education;ROM (range of motion);stair training;strengthening;stretching;transfer training  -               User Key  (r) = Recorded By, (t) = Taken By, (c) = Cosigned By      Initials Name Provider Type     Aida Cummins PT Physical Therapist                   Clinical Impression       Row Name 02/19/25 1022          Pain    Pre/Posttreatment Pain Comment C/o bottom pain from positioning in bed, but also complaining about sitting [facility]  -       Row Name 02/19/25 1022          Plan of Care Review    Plan of Care Reviewed With patient  -     Outcome Evaluation Pt is a 70 yo M admitted from home, POD 1 aortogram, aortic and B iliac artery angioplasty with stent, and R femoral endarterectomy. Pt lives alone  in a 2nd floor condo with 16-18 LEIDY and B HRs. Pt reports no use of AD at BL, is typically independent and able to care for himself per his report. Pt states he has more difficulty ascending stairs than descending, is fatigued following but able to do it. Pt presents to PT with impaired strength, endurance, and balance limiting overall mobility. Pt transferred to EOB with SV, STS with SV, and ambulated 90ft with no AD and SBA-SV. Pt reporting it felt good to walk initially, but then self-limiting himself, not wanting to progress gait distance so he doesn't get  tired. PT encouraged but pt turning himself around to go back to his room. Pt c/o bottom pain d/t positioning in bed, but also not pleased about sitting UIC. Pt appears to have the ability to care for himself, but motivation seems to be an issue, thoough pt overall is highly motivated to return home and states he can care for himself. PT will continue to follow, anticipate DC home with HHPT.  -       Row Name 02/19/25 1022          Therapy Assessment/Plan (PT)    Patient/Family Therapy Goals Statement (PT) Return to [facility]  -     Rehab Potential (PT) good  -     Criteria for Skilled Interventions Met (PT) yes  -     Therapy Frequency (PT) 5 times/wk  -       Row Name 02/19/25 1022          Vital Signs    O2 Delivery Pre Treatment room air  -     O2 Delivery Intra Treatment room air  -     O2 Delivery Post Treatment room air  -       Row Name 02/19/25 1022          Positioning and Restraints    Pre-Treatment Position in bed  -     Post Treatment Position chair  -     In Chair notified nsg;reclined;call light within reach;encouraged to call for assist;exit alarm on  -               User Key  (r) = Recorded By, (t) = Taken By, (c) = Cosigned By      Initials Name Provider Type     Aida Cummins, PT Physical Therapist                   Outcome Measures       Row Name 02/19/25 1028 02/19/25 0104       How much help from another person do  you currently need...    Turning from your back to your side while in flat bed without using bedrails? 4  - 3  -CL    Moving from lying on back to sitting on the side of a flat bed without bedrails? 3  - 3  -CL    Moving to and from a bed to a chair (including a wheelchair)? 3  - 3  -CL    Standing up from a chair using your arms (e.g., wheelchair, bedside chair)? 3  - 3  -CL    Climbing 3-5 steps with a railing? 3  - 3  -CL    To walk in hospital room? 3  - 3  -CL    AM-PAC 6 Clicks Score (PT) 19  - 18  -CL    Highest Level of Mobility Goal 6 --> Walk 10 steps or more  - 6 --> Walk 10 steps or more  -      Row Name 02/19/25 1028          Functional Assessment    Outcome Measure Options AM-PAC 6 Clicks Basic Mobility (PT)  -               User Key  (r) = Recorded By, (t) = Taken By, (c) = Cosigned By      Initials Name Provider Type     Aida Cummins, PT Physical Therapist    CL Oralia Tovar RN Registered Nurse                                 Physical Therapy Education       Title: PT OT SLP Therapies (Done)       Topic: Physical Therapy (Done)       Point: Mobility training (Done)       Learning Progress Summary            Patient Acceptance, E,TB,D, VU,NR by  at 2/19/2025 1028                      Point: Body mechanics (Done)       Learning Progress Summary            Patient Acceptance, E,TB,D, VU,NR by  at 2/19/2025 1028                      Point: Precautions (Done)       Learning Progress Summary            Patient Acceptance, E,TB,D, VU,NR by  at 2/19/2025 1028                                      User Key       Initials Effective Dates Name Provider Type [facility] 04/08/22 -  Aida Cummins PT Physical Therapist PT                  PT Recommendation and Plan  Planned Therapy Interventions (PT): balance training, bed mobility training, gait training, home exercise program, patient/family education, ROM (range of motion), stair training, strengthening, stretching,  transfer training  Outcome Evaluation: Pt is a 68 yo M admitted from home, POD 1 aortogram, aortic and B iliac artery angioplasty with stent, and R femoral endarterectomy. Pt lives alone in a 2nd floor condo with 16-18 LEIDY and B HRs. Pt reports no use of AD at BL, is typically independent and able to care for himself per his report. Pt states he has more difficulty ascending stairs than descending, is fatigued following but able to do it. Pt presents to PT with impaired strength, endurance, and balance limiting overall mobility. Pt transferred to EOB with SV, STS with SV, and ambulated 90ft with no AD and SBA-SV. Pt reporting it felt good to walk initially, but then self-limiting himself, not wanting to progress gait distance so he doesn't get  tired. PT encouraged but pt turning himself around to go back to his room. Pt c/o bottom pain d/t positioning in bed, but also not pleased about sitting [facility]IC. Pt appears to have the ability to care for himself, but motivation seems to be an issue, thoough pt overall is highly motivated to return home and states he can care for himself. PT will continue to follow, anticipate DC home with HHPT.     Time Calculation:   PT Evaluation Complexity  History, PT Evaluation Complexity: 1-2 personal factors and/or comorbidities  Examination of Body Systems (PT Eval Complexity): total of 3 or more elements  Clinical Presentation (PT Evaluation Complexity): evolving  Clinical Decision Making (PT Evaluation Complexity): moderate complexity  Overall Complexity (PT Evaluation Complexity): moderate complexity     PT Charges       Row Name 02/19/25 1029             Time Calculation    Start Time 0917  -      Stop Time 0934  [facility]      Time Calculation (min) 17 min  -      PT Received On 02/19/25  -      PT - Next Appointment 02/20/25  -      PT Goal Re-Cert Due Date 02/26/25  -         Time Calculation- PT    Total Timed Code Minutes- PT 15 minute(s)  -         Timed Charges    96148  - Gait Training Minutes  8  -      91790 - PT Therapeutic Activity Minutes 7  -BH         Total Minutes    Timed Charges Total Minutes 15  -BH       Total Minutes 15  -BH                User Key  (r) = Recorded By, (t) = Taken By, (c) = Cosigned By      Initials Name Provider Type    Aida Sheehan, PT Physical Therapist                  Therapy Charges for Today       Code Description Service Date Service Provider Modifiers Qty    33513519863 HC GAIT TRAINING EA 15 MIN 2/19/2025 Aida Cummins, PT GP 1    02620517266 HC PT EVAL MOD COMPLEXITY 3 2/19/2025 Aida Cummins, PT GP 1            PT G-Codes  Outcome Measure Options: AM-PAC 6 Clicks Basic Mobility (PT)  AM-PAC 6 Clicks Score (PT): 19  PT Discharge Summary  Anticipated Discharge Disposition (PT): home with assist, home with home health    Aida Cummins, PT  2/19/2025

## 2025-02-19 NOTE — PLAN OF CARE
Goal Outcome Evaluation:  Plan of Care Reviewed With: patient, sibling        Progress: improving  Outcome Evaluation: VSS, pt working with PT and cleared for discharged.  pt voided via urinal. plans to be discharged home today, danya

## 2025-02-19 NOTE — SIGNIFICANT NOTE
Case Management Discharge Note      Final Note: (P) Home         Selected Continued Care - Discharged on 2/19/2025 Admission date: 2/18/2025 - Discharge disposition: Home or Self Care      Destination    No services have been selected for the patient.                Durable Medical Equipment    No services have been selected for the patient.                Dialysis/Infusion    No services have been selected for the patient.                Home Medical Care    No services have been selected for the patient.                Therapy    No services have been selected for the patient.                Community Resources    No services have been selected for the patient.                Community & DME    No services have been selected for the patient.                         Final Discharge Disposition Code: (P) 01 - home or self-care

## 2025-02-19 NOTE — PROGRESS NOTES
"Name: Justice Aldridge ADMIT: 2025   : 1955  PCP: Aretha Hdez APRN    MRN: [id number] LOS: 1 days   AGE/SEX: 69 y.o. male  ROOM: [address]    69 y.o. male post complex aortoiliac revascularization and open right groin exploration for disrupted plaque.  Today is postprocedure day 1.  Feeling well.  Wants to go home feet feel better.    Scheduled Medications:   acetaminophen, 650 mg, Oral, Q8H   Or  acetaminophen, 650 mg, Oral, Q8H   Or  acetaminophen, 650 mg, Rectal, Q8H  aspirin, 81 mg, Oral, Daily  carvedilol, 3.125 mg, Oral, BID With Meals  clopidogrel, 75 mg, Oral, Daily  gabapentin, 600 mg, Oral, Daily  glipizide, 10 mg, Oral, BID AC  heparin (porcine), 5,000 Units, Subcutaneous, Q8H  insulin glargine, 80 Units, Subcutaneous, Daily  insulin glargine, 85 Units, Subcutaneous, Nightly  insulin lispro, 2-9 Units, Subcutaneous, 4x Daily AC & at Bedtime  metFORMIN, 1,000 mg, Oral, BID With Meals  pravastatin, 40 mg, Oral, Daily  timolol, 1 drop, Both Eyes, Q12H    Active Infusions:lactated ringers, 50 mL/hr, Last Rate: 50 mL/hr (25)    Vital Signs  Vital Signs Patient Vitals for the past 24 hrs:   BP Temp Temp src Pulse Resp SpO2 Height Weight   25 0737 -- 98.6 °F (37 °C) Oral -- 20 -- -- --   25 0513 -- -- -- -- -- -- 175.3 cm (69\") 110 kg (242 lb 1 oz)   25 0100 -- -- -- 94 -- 93 % -- --   25 0047 154/75 97.6 °F (36.4 °C) Oral -- -- -- 175.3 cm (69\") --   25 0000 158/85 -- -- 93 -- 92 % -- --   25 (!) 183/80 -- -- 94 -- -- -- --   25 2330 166/88 -- -- 95 16 92 % -- --   25 2322 129/82 -- -- 96 16 95 % -- --   256 169/69 -- -- -- -- -- -- --   25 -- -- -- 93 20 97 % -- --   25 -- -- -- 90 16 96 % -- --   25 -- -- -- 84 16 96 % -- --   25 -- -- -- 84 16 96 % -- --   25 2100 154/81 -- -- 83 13 96 % -- --   25 153/75 -- -- 80 16 96 % -- --   25 " 2015 158/79 -- -- 80 16 96 % -- --   02/18/25 2000 159/80 -- -- 89 16 97 % -- --   02/18/25 1945 157/73 -- -- 83 16 96 % -- --   02/18/25 1930 168/77 -- -- 92 16 96 % -- --   02/18/25 1915 164/80 -- -- 91 16 96 % -- --   02/18/25 1900 163/75 -- -- 86 16 94 % -- --   02/18/25 1845 161/81 -- -- 88 16 92 % -- --   02/18/25 1830 154/81 -- -- 92 16 92 % -- --   02/18/25 1815 154/81 -- -- 93 16 92 % -- --   02/18/25 1800 157/77 -- -- 95 16 90 % -- --   02/18/25 1745 157/85 -- -- 96 16 90 % -- --   02/18/25 1735 154/80 -- -- 98 16 91 % -- --   02/18/25 1730 -- -- -- 99 16 91 % -- --   02/18/25 1725 (!) 144/117 98.6 °F (37 °C) Oral 101 16 92 % -- --   02/18/25 1250 -- -- -- 86 -- 95 % -- --   02/18/25 1245 -- -- -- 84 -- 95 % -- --   02/18/25 1240 -- -- -- 85 16 98 % -- --   02/18/25 1233 -- -- -- 85 16 96 % -- --   02/18/25 1137 138/83 97.9 °F (36.6 °C) Oral 88 16 94 % -- --     I/O:  I/O last 3 completed shifts:  In: 1600 [P.O.:450; I.V.:1150]  Out: 1800 [Urine:1700; Blood:100]    Physical Exam: Right groin incision and left groin puncture sites without hematoma or pseudoaneurysm suspected.    CBC    Results from last 7 days   Lab Units 02/19/25  0301 02/12/25  1532   WBC 10*3/mm3 19.31* 11.56*   HEMOGLOBIN g/dL 16.3 17.7   PLATELETS 10*3/mm3 170 227     BMP   Results from last 7 days   Lab Units 02/19/25  0301 02/12/25  1532   SODIUM mmol/L 132* 134*   POTASSIUM mmol/L 4.8 4.5   CHLORIDE mmol/L 99 98   CO2 mmol/L 23.0 25.0   BUN mg/dL 26* 18   CREATININE mg/dL 1.52* 1.17   GLUCOSE mg/dL 399* 242*     Cr Clearance Estimated Creatinine Clearance: 56.8 mL/min (A) (by C-G formula based on SCr of 1.52 mg/dL (H)).  Assessment & Plan   Assessment & Plan    Atherosclerosis of native arteries of extremities with intermittent claudication, bilateral legs    Atherosclerosis of aorta    69 y.o. male with PAD post percutaneous revascularization procedure requiring right groin exploration.  Patient appears to be hemodynamically stable  and is highly motivated to go home, possibly so he can smoke.  Glucose out-of-control, but plan to restart insulin.  Remove lines and catheters, mobilize, assess ambulation.  PT to see as appropriate.    Bruce Burkett MD  02/19/25, 08:02 EST    Office contact: [phone]

## 2025-02-19 NOTE — DISCHARGE SUMMARY
Name: Justice Aldridge ADMIT: 2025   : 1955  PCP: Aretha Hdez APRN    MRN: [id number] LOS: 1 days   AGE/SEX: 69 y.o. male  Location: [facility]     Date of Admission: 2025  Date of Discharge:  2025    PCP: Aretha Hdez APRN    DISCHARGE DIAGNOSIS  Active Hospital Problems    Diagnosis  POA    **Atherosclerosis of native arteries of extremities with intermittent claudication, bilateral legs [I70.213]  Yes    Atherosclerosis of aorta [I70.0]  Yes      Resolved Hospital Problems   No resolved problems to display.     PROCEDURE(S)  2025:   Ultrasound-guided access to the right and left common femoral arteries with femoral arteriograms  Aortogram, bilateral iliac arteriograms.  Aortic stent graft angioplasty using 11 x 39 mm VBX stent graft with gentle post stent deployment balloon angioplasty to 20 mm  Aortoiliac stenting using a kilt technique and 8 x 79 and then 8 x 39 VBX stent grafts placed in the right and left common iliac arteries extending proximally into the aorta into the previously placed aortic stent graft  Right external iliac stent angioplasty using a 7 x 100 mm EV3 Protégé self-expanding stent, with post stent deployment balloon angioplasty to 6 mm.  Left external iliac stent angioplasty using two 7 x 60 EV3 Protégé self-expanding stents with post stent deployment balloon angioplasty to 8 and 6 mm.  Successful deployment of Perclose percutaneous closure devices right and left groins  Right femoral endarterectomy with patch    HOSPITAL COURSE  Patient is a 69 y.o. male admitted to [facility] for treatment of peripheral arterial disease of the lower extremities with disabling claudications and possible early rest pain.  He underwent the above described procedure on the date of admission.  Numerous stents were placed.  We identified an occlusive lesion in the right groin prior to the conclusion of the procedure, and so following delivery of closure  devices, he underwent right groin exploration for femoral endarterectomy with patch.  He had satisfactory perfusion at the end of the procedure.  On the first postoperative day he was doing well and strongly desired home discharge.  Vital signs were satisfactory, and he was voiding spontaneously.  He was up and about, evaluated by physical therapy and cleared for home discharge.  Family was at bedside and willing to take him home.  He was discharged to home in good condition.  Listed home medications include both atorvastatin and pravastatin.  I recommend 1 or the other but he does not need both.  I provide prescription for clopidogrel for 1 month, after which he no longer needs to take it.  He is given a prescription for oxycodone.  Otherwise I intend no changes to his medication regimen.  Recommend follow-up 2 weeks for incision check.    DISCHARGE CONDITION  Good    DISCHARGE DISPOSITION   Home or Self Care    DISCHARGE MEDICATIONS     Discharge Medications        New Medications        Instructions Start Date   clopidogrel 75 MG tablet  Commonly known as: PLAVIX   75 mg, Oral, Daily   Start Date: February 20, 2025     oxyCODONE 5 MG immediate release tablet  Commonly known as: ROXICODONE   5 mg, Oral, Every 6 Hours PRN             Changes to Medications        Instructions Start Date   carvedilol 3.125 MG tablet  Commonly known as: COREG  What changed: when to take this   TAKE 1 TABLET BY MOUTH TWICE A DAY             Continue These Medications        Instructions Start Date   aspirin 81 MG tablet   81 mg, Daily      atorvastatin 40 MG tablet  Commonly known as: LIPITOR   40 mg, Daily      gabapentin 600 MG tablet  Commonly known as: NEURONTIN   1 tablet, Every 12 Hours Scheduled      glipizide 10 MG tablet  Commonly known as: GLUCOTROL   10 mg, 2 Times Daily Before Meals      Insulin Lispro 100 UNIT/ML injection  Commonly known as: humaLOG   3 Times Daily Before Meals      Lantus SoloStar 100 UNIT/ML injection  pen  Generic drug: Insulin Glargine   Inject  under the skin into the appropriate area as directed 2 (Two) Times a Day. 80 UNITS IN AM AND 85 UNITS AT BEDTIME      metFORMIN 1000 MG tablet  Commonly known as: GLUCOPHAGE   1,000 mg, Oral, 2 Times Daily With Meals, BRING MEDICATIONS WITH YOU- HOLD PER SURGEON'S INSTRCUTIONS      pravastatin 40 MG tablet  Commonly known as: PRAVACHOL   40 mg, Daily      timolol 0.5 % ophthalmic solution  Commonly known as: TIMOPTIC   timolol maleate 0.5 % eye drops   INSTILL 1 DROP IN BOTH EYES TWICE A DAY      TRULICITY SC   Subcutaneous, Weekly, HOLD FOR ONE WEEK PRIOR TO SURGERY             Diet Instructions       Diet: Diabetic Diets; Consistent Carbohydrate; Regular (IDDSI 7); Thin (IDDSI 0)      Discharge Diet: Diabetic Diets    Diabetic Diet: Consistent Carbohydrate    Texture: Regular (IDDSI 7)    Fluid Consistency: Thin (IDDSI 0)           Activity Instructions       Activity as Tolerated      Bathing Restrictions      Type of Restriction: Bathing    Bathing Restrictions: No Shower    May remove all dressings and shower beginning tomorrow.    Gradually Increase Activity Until at Pre-Hospitalization Level          No future appointments.  Additional Instructions for the Follow-ups that You Need to Schedule       Discharge Follow-up with Specified Provider: Dr. Burkett; 2 Weeks   As directed      To: Dr. Burkett   Follow Up: 2 Weeks               Follow-up Information       Aretha Hdez APRN .    Specialty: Nurse Practitioner  Contact information:  [address] [place] [address]  [phone]                           VQI Discharge Meds  The patient is being discharged on antiplatelet therapy Yes  The patient is being discharged on Statin therapy Yes    Bruce Burkett MD  02/19/25, 11:15 EST    Office Number [phone]

## 2025-02-19 NOTE — PLAN OF CARE
Goal Outcome Evaluation:  Plan of Care Reviewed With: patient           Outcome Evaluation: Pt is a 68 yo M admitted from home, POD 1 aortogram, aortic and B iliac artery angioplasty with stent, and R femoral endarterectomy. Pt lives alone in a 2nd floor condo with 16-18 LEIDY and B HRs. Pt reports no use of AD at BL, is typically independent and able to care for himself per his report. Pt states he has more difficulty ascending stairs than descending, is fatigued following but able to do it. Pt presents to PT with impaired strength, endurance, and balance limiting overall mobility. Pt transferred to EOB with SV, STS with SV, and ambulated 90ft with no AD and SBA-SV. Pt reporting it felt good to walk initially, but then self-limiting himself, not wanting to progress gait distance so he doesn't get  tired. PT encouraged but pt turning himself around to go back to his room. Pt c/o bottom pain d/t positioning in bed, but also not pleased about sitting UIC. Pt appears to have the ability to care for himself, but motivation seems to be an issue, thoough pt overall is highly motivated to return home and states he can care for himself. PT will continue to follow, anticipate DC home with HHPT.    Anticipated Discharge Disposition (PT): home with assist, home with home health

## 2025-02-19 NOTE — PLAN OF CARE
Goal Outcome Evaluation:              Outcome Evaluation: vss, a/ox4, no c/o pain this shift, groin sites CDI,ambulates to bathroom with 1 persons[address] in place. fluids infusing per MD order, Plan of care ongoing this shift.

## 2025-02-20 NOTE — OUTREACH NOTE
Prep Survey      Flowsheet Row Responses   [facility] facility patient discharged from? [facility]   Is LACE score < 7 ? No   Eligibility Readm Mgmt   Discharge diagnosis Peripheral arterial disease,    AORTOGRAM, AORTIC AND BILATERAL ILIAC ARTERY ANGIOPLASTY WITH STENT, RIGHT FEMORAL ENDARTERECTOMY   Does the patient have one of the following disease processes/diagnoses(primary or secondary)? General Surgery   Does the patient have Home health ordered? No   Is there a DME ordered? No   Prep survey completed? Yes            Marylou Ramos Registered Nurse

## 2025-02-21 ENCOUNTER — TELEPHONE (OUTPATIENT)
Age: 70
End: 2025-02-21
Payer: MEDICARE

## 2025-02-21 ENCOUNTER — TELEPHONE (OUTPATIENT)
Age: 70
End: 2025-02-21

## 2025-02-21 NOTE — TELEPHONE ENCOUNTER
Provider: HENRI    Caller: Chey Cho    Relationship to Patient: Emergency Contact    Phone Number: [phone]    Reason for Call: PT HAS BEEN DOWN ON HIS BACK SINCE SURG- PER PT SISTER- THEY DO NOT THINK THIS IS SURG RELATED BUT WERE WANTING TO SPEAK TO DR ÁLVAREZ REGARDING RECOMMENDATIONS

## 2025-02-21 NOTE — TELEPHONE ENCOUNTER
Lvm to patient informing him that we would like to know what is going on with him so that it can be relayed to Dr. Burkett.

## 2025-02-21 NOTE — TELEPHONE ENCOUNTER
Caller: Justice Aldridge    Relationship: Self    Best call back number: [phone]    What is the best time to reach you: ANY    Who are you requesting to speak with (clinical staff, provider,  specific staff member): DR. ÁLVAREZ    Do you know the name of the person who called: PT    What was the call regarding: PT STATES HE IS IN IMMENSE PAIN AND WOULD LIKE A CALL BACK FROM DR. ÁLVAREZ. THANK YOU    Is it okay if the provider responds through MyChart: NO

## 2025-02-22 ENCOUNTER — DOCUMENTATION (OUTPATIENT)
Age: 70
End: 2025-02-22
Payer: MEDICARE

## 2025-02-22 VITALS
BODY MASS INDEX: 35.85 KG/M2 | HEIGHT: 69 IN | TEMPERATURE: 98.6 F | SYSTOLIC BLOOD PRESSURE: 154 MMHG | DIASTOLIC BLOOD PRESSURE: 75 MMHG | RESPIRATION RATE: 20 BRPM | OXYGEN SATURATION: 93 % | HEART RATE: 94 BPM | WEIGHT: 242.06 LBS

## 2025-02-22 NOTE — PROGRESS NOTES
Name: Justice Aldridge ADMIT: (Not on file)   : 1955  PCP: Aretha Hdez APRN    MRN: [id number] LOS: 0 days   AGE/SEX: 69 y.o. male  ROOM:   Room/bed info not found     Vascular Surgery Note    Pt status post aortic an dbilateral iliac stenting with right femoral endarterectomy on  with Dr. Burkett.   Patient called to report lower back pain that started approximately 1 hour after he got home from the hospital.  He reports no back pain immediately following his surgery but has had persistent back pain since it started on .  He is unable to recall if he is ever had back pain like this before.  Denies any weakness or numbness in his lower extremities.  Patient's description of the pain sounds musculoskeletal in nature.  The pain medicine the patient was discharged with is helping.  Discussed with patient that this kind of pain is not typical after aortic and iliac arterial stenting but there are some complications such as retroperitoneal hematoma that could potentially cause back pain.  I discussed with patient that in order to evaluate for this he would likely need a CT scan which I am unable to provide over the weekend but patient could get through the ER.  The patient states he does not feel his symptoms are severe enough to warrant proceeding to ER presently but he will continue to monitor the symptoms.  We did discuss patient coming in to be seen by one of the vascular surgery staff on Monday in clinic and he stated he will call our office on Monday if his pain is not improved.      Rajeev Lei II, MD  25  11:17 EST  Office Number [phone]    [facility] Vascular Surgery

## 2025-02-25 ENCOUNTER — READMISSION MANAGEMENT (OUTPATIENT)
Dept: CALL CENTER | Facility: HOSPITAL | Age: 70
End: 2025-02-25
Payer: MEDICARE

## 2025-02-25 NOTE — OUTREACH NOTE
General Surgery Week 1 Survey      Flowsheet Row Responses   [facility] patient discharged from? [facility]   Does the patient have one of the following disease processes/diagnoses(primary or secondary)? General Surgery   Week 1 attempt successful? No   Unsuccessful attempts Attempt 1            CHRISTOPHER DAMIAN - Registered Nurse

## 2025-02-27 RX ORDER — CILOSTAZOL 100 MG/1
100 TABLET ORAL 2 TIMES DAILY
Qty: 180 TABLET | Refills: 1 | Status: SHIPPED | OUTPATIENT
Start: 2025-02-27

## 2025-03-05 PROBLEM — Z95.820 S/P ANGIOPLASTY WITH STENT: Status: ACTIVE | Noted: 2025-03-05

## 2025-03-05 NOTE — PROGRESS NOTES
"Chief Complaint  No chief complaint on file.    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  Comes in today with dramatic improvement in lower extremity pain and functional status.  Now able to climb the stairway leading to his apartment without having to stop.  Now free of claudication symptoms, but has not yet gone to the grocery store.    Objective   Vital Signs:  There were no vitals taken for this visit.  Estimated body mass index is 35.75 kg/m² as calculated from the following:    Height as of 2/19/25: 175.3 cm (69\").    Weight as of 2/19/25: 110 kg (242 lb 1 oz).        Justice Aldridge  reports that he has been smoking cigarettes. He has a 30 pack-year smoking history. He has never used smokeless tobacco. Tobacco Education/Cessation: Justice Aldridge  reports that he has been smoking cigarettes. He has a 30 pack-year smoking history. He has never used smokeless tobacco. I have educated him on the risk of diseases from using tobacco products such as arterial disease.  I advised him to quit and he is not willing to quit.    Exam: Right groin incision healed.  Left groin puncture site clean and intact.  No hematoma or pseudoaneurysm suspected.  Right and left pedal artery pulses not palpated.  Dry gangrene tip of right hallux.         Assessment and Plan     Diagnoses and all orders for this visit:    1. Atherosclerosis of native arteries of extremities with intermittent claudication, bilateral legs (Primary)    2. Atherosclerosis of aorta    3. S/P angioplasty with stent    Summary: 69-year-old gentleman with disabling claudication and rest pain of the right and left feet refractory " to cilostazol, with infrarenal aortic stenosis, left common iliac artery occlusion and prominent occlusive disease involving the right common and bilateral external iliac arteries.  Underwent complex revascularization procedure 2/18/2025 including aortoiliac stent angioplasties and unplanned right femoral endarterectomy associated with puncture and not a closure device complication had good signals in pedal arteries at the conclusion of the his procedure.  Discharged home on the first postoperative day.  Presents today amazed that the relief of lower extremity symptoms.  Having no problems with right groin incision or puncture site.  Still having tenderness related to tip of right great toe, where he had treatment for ingrown toenail by his podiatrist, and now with gangrene.  Advised to pursue topical care.  Activity add lab.  Return 4 weeks with ABIs and aortoiliac duplex scan.  Continue aspirin, clopidogrel and atorvastatin.  Continue cilostazol for now as well, but will likely discontinue in the near future.    Follow Up     No follow-ups on file.  Patient was given instructions and counseling regarding his condition or for health maintenance advice. Please see specific information pulled into the AVS if appropriate.

## 2025-03-07 ENCOUNTER — OFFICE VISIT (OUTPATIENT)
Age: 70
End: 2025-03-07
Payer: MEDICARE

## 2025-03-07 VITALS
WEIGHT: 242 LBS | BODY MASS INDEX: 35.84 KG/M2 | HEIGHT: 69 IN | DIASTOLIC BLOOD PRESSURE: 84 MMHG | SYSTOLIC BLOOD PRESSURE: 151 MMHG | HEART RATE: 96 BPM

## 2025-03-07 DIAGNOSIS — I70.0 ATHEROSCLEROSIS OF AORTA: ICD-10-CM

## 2025-03-07 DIAGNOSIS — Z95.820 S/P ANGIOPLASTY WITH STENT: ICD-10-CM

## 2025-03-07 DIAGNOSIS — I70.213 ATHEROSCLEROSIS OF NATIVE ARTERIES OF EXTREMITIES WITH INTERMITTENT CLAUDICATION, BILATERAL LEGS: Primary | ICD-10-CM

## 2025-03-07 PROCEDURE — 3077F SYST BP >= 140 MM HG: CPT | Performed by: SURGERY

## 2025-03-07 PROCEDURE — 3079F DIAST BP 80-89 MM HG: CPT | Performed by: SURGERY

## 2025-03-07 PROCEDURE — 99024 POSTOP FOLLOW-UP VISIT: CPT | Performed by: SURGERY

## 2025-03-07 PROCEDURE — 1159F MED LIST DOCD IN RCRD: CPT | Performed by: SURGERY

## 2025-03-07 PROCEDURE — 1160F RVW MEDS BY RX/DR IN RCRD: CPT | Performed by: SURGERY

## 2025-03-11 ENCOUNTER — READMISSION MANAGEMENT (OUTPATIENT)
Dept: CALL CENTER | Facility: HOSPITAL | Age: 70
End: 2025-03-11
Payer: MEDICARE

## 2025-03-11 NOTE — OUTREACH NOTE
General Surgery Week 3 Survey      Flowsheet Row Responses   [facility] patient discharged from? [place]   Does the patient have one of the following disease processes/diagnoses(primary or secondary)? General Surgery   Week 3 attempt successful? Yes   Call start time 1144   Call end time 1145   Discharge diagnosis Peripheral arterial disease,    AORTOGRAM, AORTIC AND BILATERAL ILIAC ARTERY ANGIOPLASTY WITH STENT, RIGHT FEMORAL ENDARTERECTOMY   Is patient permission given to speak with other caregiver? Yes   List who call center can speak with Chey sister   Person spoke with today (if not patient) and relationship Chey sister   Meds reviewed with patient/caregiver? Yes   Is the patient taking all medications as directed (includes completed medication regime)? Yes   Does the patient have a follow up appointment scheduled with their surgeon? Yes   Has the patient kept scheduled appointments due by today? Yes  [Surgeon's office FU apt]   Did the patient receive a copy of their discharge instructions? Yes   Nursing interventions Reviewed instructions with patient   What is the patient's perception of their health status since discharge? Improving   Nursing interventions Nurse provided patient education   Is the patient/caregiver able to teach back signs and symptoms of incisional infection? Increased redness, swelling or pain at the incisonal site, Increased drainage or bleeding   Is the patient/caregiver able to teach back steps to recovery at home? Set small, achievable goals for return to baseline health, Rest and rebuild strength, gradually increase activity, Eat a well-balance diet, Make a list of questions for surgeon's appointment   Is the patient/caregiver able to teach back the hierarchy of who to call/visit for symptoms/problems? PCP, Specialist, Home health nurse, Urgent Care, ED, 911 Yes   Week 3 call completed? Yes   Graduated Yes   Graduated/Revoked comments Pt's sister states pt has been  improving   Call end time 1145            Myesha H - Registered Nurse

## 2025-04-30 NOTE — PROGRESS NOTES
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  Patient is doing very well, ambulating without limitation.  No rest pain.  No concerns.    Objective   Vital Signs:  /78 (BP Location: Left arm, Patient Position: Sitting, Cuff Size: Large Adult)   Pulse 70   Resp 16   Ht 175 cm (68.9\")   BMI 35.84 kg/m²   Estimated body mass index is 35.84 kg/m² as calculated from the following:    Height as of this encounter: 175 cm (68.9\").    Weight as of 3/7/25: 110 kg (242 lb).        Justice Aldridge  reports that he has been smoking cigarettes. He has a 30 pack-year smoking history. He has been exposed to tobacco smoke. He has never used smokeless tobacco..     Personal review of data: Images and tracings of bilateral lower extremity ABIs and aortoiliac duplex scan performed today.       Assessment and Plan     Diagnoses and all orders for this visit:    1. Atherosclerosis of native arteries of extremities with intermittent claudication, bilateral legs (Primary)  -     Doppler Ankle Brachial Index Single Level CAR; Future  -     Duplex Aorta IVC Iliac Graft Complete CAR; Future    2. Atherosclerosis of aorta  -     Doppler Ankle Brachial Index Single Level CAR; Future  -     Duplex Aorta IVC Iliac Graft Complete CAR; Future    3. S/P angioplasty with stent  -     Doppler Ankle Brachial Index Single Level CAR; Future  -     Duplex Aorta IVC Iliac Graft Complete CAR; Future    4. Bilateral carotid artery stenosis    Summary: 69-year-old gentleman with peripheral arterial disease of the right and left lower extremities with rest pain and short distance claudications refractory to cilostazol.  Has complicated pattern of arterial occlusive disease including distal aortic stenosis, left common femoral artery occlusion, and right common and " bilateral external iliac artery stenoses.  On 2/28/2025 underwent complex revascularization procedure including aortoiliac stent angioplasties and unplanned right femoral endarterectomy.  Discharged on DAPT and atorvastatin.  Has been taking cilostazol.  He returns today   Lower extremity arterial testing today demonstrates mild right leg ischemia with BISHOP 0.81, and normal left leg perfusion with BISHOP 1.04.  Aortoiliac duplex scan with widely patent aorta and bilateral common iliac arteries with no residual stenosis.  Known to have carotid stenosis post R CEA.  Recent duplex January 2025 with postop changes on that side and less than 50% stenosis of the contralateral left ICA.  Needs repeat duplex early 2026.  Will see again in 6 months with bilateral lower extremity ABIs and aortoiliac duplex scan.  Continue DAPT and atorvastatin for now.    Follow Up     Return in about 6 months (around 11/2/2025).  Patient was given instructions and counseling regarding his condition or for health maintenance advice. Please see specific information pulled into the AVS if appropriate.

## 2025-05-02 ENCOUNTER — OFFICE VISIT (OUTPATIENT)
Age: 70
End: 2025-05-02
Payer: MEDICARE

## 2025-05-02 ENCOUNTER — HOSPITAL ENCOUNTER (OUTPATIENT)
Facility: HOSPITAL | Age: 70
Discharge: HOME | End: 2025-05-02
Payer: MEDICARE

## 2025-05-02 VITALS
BODY MASS INDEX: 35.84 KG/M2 | RESPIRATION RATE: 16 BRPM | HEART RATE: 70 BPM | DIASTOLIC BLOOD PRESSURE: 78 MMHG | HEIGHT: 69 IN | SYSTOLIC BLOOD PRESSURE: 130 MMHG

## 2025-05-02 DIAGNOSIS — Z95.820 S/P ANGIOPLASTY WITH STENT: ICD-10-CM

## 2025-05-02 DIAGNOSIS — I70.0 ATHEROSCLEROSIS OF AORTA: ICD-10-CM

## 2025-05-02 DIAGNOSIS — I70.213 ATHEROSCLEROSIS OF NATIVE ARTERIES OF EXTREMITIES WITH INTERMITTENT CLAUDICATION, BILATERAL LEGS: Primary | ICD-10-CM

## 2025-05-02 DIAGNOSIS — I70.213 ATHEROSCLEROSIS OF NATIVE ARTERIES OF EXTREMITIES WITH INTERMITTENT CLAUDICATION, BILATERAL LEGS: ICD-10-CM

## 2025-05-02 DIAGNOSIS — I65.23 BILATERAL CAROTID ARTERY STENOSIS: ICD-10-CM

## 2025-05-02 LAB
ABDOMINAL DIST AORTA AP: 1.4 CM
ABDOMINAL DIST AORTA TRANS: 1.5 CM
ABDOMINAL DIST AORTA VEL: 58 CM/S
ABDOMINAL LT COM ILIAC VEL: 144 CM/S
ABDOMINAL LT EXT ILIAC VEL: 197 CM/S
ABDOMINAL MID AORTA AP: 1.9 CM
ABDOMINAL MID AORTA TRANS: 1.9 CM
ABDOMINAL MID AORTA VEL: 79 CM/S
ABDOMINAL RT COM ILIAC VEL: 136 CM/S
ABDOMINAL RT EXT ILIAC VEL: 157 CM/S
BH CV LOWER ARTERIAL LEFT ABI RATIO: 1.04
BH CV LOWER ARTERIAL LEFT DORSALIS PEDIS SYS MAX: 160
BH CV LOWER ARTERIAL LEFT POST TIBIAL SYS MAX: 174
BH CV LOWER ARTERIAL RIGHT ABI RATIO: 0.81
BH CV LOWER ARTERIAL RIGHT DORSALIS PEDIS SYS MAX: 120
BH CV LOWER ARTERIAL RIGHT POST TIBIAL SYS MAX: 136
UPPER ARTERIAL LEFT ARM BRACHIAL SYS MAX: NORMAL
UPPER ARTERIAL RIGHT ARM BRACHIAL SYS MAX: NORMAL

## 2025-05-02 PROCEDURE — 99024 POSTOP FOLLOW-UP VISIT: CPT | Performed by: SURGERY

## 2025-05-02 PROCEDURE — 93978 VASCULAR STUDY: CPT

## 2025-05-02 PROCEDURE — 93922 UPR/L XTREMITY ART 2 LEVELS: CPT

## (undated) DEVICE — SPONGE,LAP,18"X18",DLX,XR,ST,5/PK,40/PK: Brand: MEDLINE

## (undated) DEVICE — CVR PROB 96IN LF STRL

## (undated) DEVICE — CATH GUIDE SOFTVU SELECT/V HT OMNI .038 5F 65CM

## (undated) DEVICE — SYR LUERLOK 20CC BX/50

## (undated) DEVICE — SYR LUERLOK 30CC

## (undated) DEVICE — ATLAS®  PTA BALLOON DILATATION CATHETER 20 MM X 40 MM, 75 CM CATHETER: Brand: ATLAS®

## (undated) DEVICE — SYR LL TP 10ML STRL

## (undated) DEVICE — GLV SURG SENSICARE PI MIC PF SZ7 LF STRL

## (undated) DEVICE — SYR LUERLOK 5CC

## (undated) DEVICE — CATH GUIDE SOFTVU FLUSH HT PIG .035 5F 65CM

## (undated) DEVICE — LOU CYSTO: Brand: MEDLINE INDUSTRIES, INC.

## (undated) DEVICE — RADIFOCUS GLIDEWIRE: Brand: GLIDEWIRE

## (undated) DEVICE — PINNACLE R/O II INTRODUCER SHEATH WITH RADIOPAQUE MARKER: Brand: PINNACLE

## (undated) DEVICE — SYRINGE KIT,PACKAGED,,150FT,MK 7(ANGIO-ARTERION, 150ML SYR KIT W/QFT,MC)(60729385): Brand: MEDRAD® MARK 7 ARTERION DISPOSABLE SYRINGE 150 ML WITH QUICK FILL TUBE

## (undated) DEVICE — PK ANGIO 40

## (undated) DEVICE — SOL NACL 0.9PCT 1000ML

## (undated) DEVICE — NITINOL WIRE WITH HYDROPHILIC TIP: Brand: SENSOR

## (undated) DEVICE — PINNACLE INTRODUCER SHEATH: Brand: PINNACLE

## (undated) DEVICE — PERCLOSE™ PROSTYLE™ SUTURE-MEDIATED CLOSURE AND REPAIR SYSTEM: Brand: PERCLOSE™ PROSTYLE™

## (undated) DEVICE — TIDISHIELD UROLOGY DRAIN BAGS FROSTY VINYL STERILE FITS SIEMENS UROSKOP ACCESS 20 PER CASE: Brand: TIDISHIELD

## (undated) DEVICE — INFLATION DEVICE: Brand: ENCORE™ 26

## (undated) DEVICE — RADIFOCUS GLIDEWIRE ADVANTAGE GUIDEWIRE: Brand: GLIDEWIRE ADVANTAGE

## (undated) DEVICE — NAVICROSS SUPPORT CATHETER: Brand: NAVICROSS

## (undated) DEVICE — ST ACC MICROPUNCTURE STFF .018 ECHO/PLAT/TP 4F/10CM 21G/7CM

## (undated) DEVICE — GLV SURG SIGNATURE ESSENTIAL PF LTX SZ7.5

## (undated) DEVICE — PTA BALLOON DILATATION CATHETER: Brand: MUSTANG™